# Patient Record
Sex: MALE | Race: WHITE | Employment: OTHER | URBAN - METROPOLITAN AREA
[De-identification: names, ages, dates, MRNs, and addresses within clinical notes are randomized per-mention and may not be internally consistent; named-entity substitution may affect disease eponyms.]

---

## 2020-02-02 ENCOUNTER — APPOINTMENT (OUTPATIENT)
Dept: GENERAL RADIOLOGY | Age: 80
DRG: 335 | End: 2020-02-02
Payer: MEDICARE

## 2020-02-02 ENCOUNTER — HOSPITAL ENCOUNTER (INPATIENT)
Age: 80
LOS: 12 days | Discharge: NURSING, REHAB OR LONG-TERM CARE | DRG: 335 | End: 2020-02-14
Attending: EMERGENCY MEDICINE | Admitting: INTERNAL MEDICINE
Payer: MEDICARE

## 2020-02-02 ENCOUNTER — APPOINTMENT (OUTPATIENT)
Dept: CT IMAGING | Age: 80
DRG: 335 | End: 2020-02-02
Payer: MEDICARE

## 2020-02-02 PROBLEM — N17.9 AKI (ACUTE KIDNEY INJURY) (HCC): Status: ACTIVE | Noted: 2020-02-02

## 2020-02-02 PROBLEM — K56.609 SBO (SMALL BOWEL OBSTRUCTION) (HCC): Status: ACTIVE | Noted: 2020-02-02

## 2020-02-02 PROBLEM — K56.609 SMALL BOWEL OBSTRUCTION (HCC): Status: ACTIVE | Noted: 2020-02-02

## 2020-02-02 PROBLEM — E78.5 HYPERLIPIDEMIA: Status: ACTIVE | Noted: 2020-02-02

## 2020-02-02 PROBLEM — I10 HYPERTENSION: Status: ACTIVE | Noted: 2020-02-02

## 2020-02-02 LAB
A/G RATIO: 1.6 (ref 1.1–2.2)
ALBUMIN SERPL-MCNC: 4.7 G/DL (ref 3.4–5)
ALP BLD-CCNC: 60 U/L (ref 40–129)
ALT SERPL-CCNC: 16 U/L (ref 10–40)
ANION GAP SERPL CALCULATED.3IONS-SCNC: 21 MMOL/L (ref 3–16)
AST SERPL-CCNC: 31 U/L (ref 15–37)
BASOPHILS ABSOLUTE: 0 K/UL (ref 0–0.2)
BASOPHILS RELATIVE PERCENT: 0.1 %
BILIRUB SERPL-MCNC: 1 MG/DL (ref 0–1)
BILIRUBIN URINE: NEGATIVE
BLOOD, URINE: NEGATIVE
BUN BLDV-MCNC: 90 MG/DL (ref 7–20)
CALCIUM SERPL-MCNC: 10 MG/DL (ref 8.3–10.6)
CHLORIDE BLD-SCNC: 88 MMOL/L (ref 99–110)
CLARITY: CLEAR
CO2: 26 MMOL/L (ref 21–32)
COLOR: YELLOW
CREAT SERPL-MCNC: 3.5 MG/DL (ref 0.8–1.3)
CREATININE URINE: 245.6 MG/DL (ref 39–259)
EOSINOPHILS ABSOLUTE: 0 K/UL (ref 0–0.6)
EOSINOPHILS RELATIVE PERCENT: 0 %
GFR AFRICAN AMERICAN: 21
GFR NON-AFRICAN AMERICAN: 17
GLOBULIN: 2.9 G/DL
GLUCOSE BLD-MCNC: 140 MG/DL (ref 70–99)
GLUCOSE URINE: NEGATIVE MG/DL
HCT VFR BLD CALC: 38.1 % (ref 40.5–52.5)
HEMOGLOBIN: 12.8 G/DL (ref 13.5–17.5)
KETONES, URINE: 15 MG/DL
LEUKOCYTE ESTERASE, URINE: NEGATIVE
LIPASE: 233 U/L (ref 13–60)
LYMPHOCYTES ABSOLUTE: 1.4 K/UL (ref 1–5.1)
LYMPHOCYTES RELATIVE PERCENT: 10.7 %
MCH RBC QN AUTO: 32.2 PG (ref 26–34)
MCHC RBC AUTO-ENTMCNC: 33.7 G/DL (ref 31–36)
MCV RBC AUTO: 95.6 FL (ref 80–100)
MICROSCOPIC EXAMINATION: ABNORMAL
MONOCYTES ABSOLUTE: 1.4 K/UL (ref 0–1.3)
MONOCYTES RELATIVE PERCENT: 10.1 %
NEUTROPHILS ABSOLUTE: 10.6 K/UL (ref 1.7–7.7)
NEUTROPHILS RELATIVE PERCENT: 79.1 %
NITRITE, URINE: NEGATIVE
PDW BLD-RTO: 12.6 % (ref 12.4–15.4)
PH UA: 5 (ref 5–8)
PLATELET # BLD: 241 K/UL (ref 135–450)
PMV BLD AUTO: 8.1 FL (ref 5–10.5)
POTASSIUM REFLEX MAGNESIUM: 4.9 MMOL/L (ref 3.5–5.1)
PROTEIN UA: NEGATIVE MG/DL
RBC # BLD: 3.98 M/UL (ref 4.2–5.9)
SODIUM BLD-SCNC: 135 MMOL/L (ref 136–145)
SODIUM URINE: <20 MMOL/L
SPECIFIC GRAVITY UA: >=1.03 (ref 1–1.03)
TOTAL PROTEIN: 7.6 G/DL (ref 6.4–8.2)
URINE TYPE: ABNORMAL
UROBILINOGEN, URINE: 0.2 E.U./DL
WBC # BLD: 13.4 K/UL (ref 4–11)

## 2020-02-02 PROCEDURE — 74176 CT ABD & PELVIS W/O CONTRAST: CPT

## 2020-02-02 PROCEDURE — 71045 X-RAY EXAM CHEST 1 VIEW: CPT

## 2020-02-02 PROCEDURE — 1200000000 HC SEMI PRIVATE

## 2020-02-02 PROCEDURE — 80053 COMPREHEN METABOLIC PANEL: CPT

## 2020-02-02 PROCEDURE — 99285 EMERGENCY DEPT VISIT HI MDM: CPT

## 2020-02-02 PROCEDURE — 85025 COMPLETE CBC W/AUTO DIFF WBC: CPT

## 2020-02-02 PROCEDURE — 96375 TX/PRO/DX INJ NEW DRUG ADDON: CPT

## 2020-02-02 PROCEDURE — 2580000003 HC RX 258: Performed by: NURSE PRACTITIONER

## 2020-02-02 PROCEDURE — 81003 URINALYSIS AUTO W/O SCOPE: CPT

## 2020-02-02 PROCEDURE — 6360000002 HC RX W HCPCS: Performed by: NURSE PRACTITIONER

## 2020-02-02 PROCEDURE — 74018 RADEX ABDOMEN 1 VIEW: CPT

## 2020-02-02 PROCEDURE — 96374 THER/PROPH/DIAG INJ IV PUSH: CPT

## 2020-02-02 PROCEDURE — 82570 ASSAY OF URINE CREATININE: CPT

## 2020-02-02 PROCEDURE — 83690 ASSAY OF LIPASE: CPT

## 2020-02-02 PROCEDURE — 51702 INSERT TEMP BLADDER CATH: CPT

## 2020-02-02 PROCEDURE — 84300 ASSAY OF URINE SODIUM: CPT

## 2020-02-02 RX ORDER — MORPHINE SULFATE 4 MG/ML
4 INJECTION, SOLUTION INTRAMUSCULAR; INTRAVENOUS EVERY 30 MIN PRN
Status: COMPLETED | OUTPATIENT
Start: 2020-02-02 | End: 2020-02-03

## 2020-02-02 RX ORDER — HYDROCODONE BITARTRATE AND ACETAMINOPHEN 10; 325 MG/15ML; MG/15ML
5 SOLUTION ORAL EVERY 4 HOURS PRN
COMMUNITY

## 2020-02-02 RX ORDER — MORPHINE SULFATE 2 MG/ML
2 INJECTION, SOLUTION INTRAMUSCULAR; INTRAVENOUS
Status: DISCONTINUED | OUTPATIENT
Start: 2020-02-02 | End: 2020-02-02 | Stop reason: HOSPADM

## 2020-02-02 RX ORDER — SODIUM CHLORIDE 0.9 % (FLUSH) 0.9 %
10 SYRINGE (ML) INJECTION EVERY 12 HOURS SCHEDULED
Status: DISCONTINUED | OUTPATIENT
Start: 2020-02-02 | End: 2020-02-14 | Stop reason: HOSPADM

## 2020-02-02 RX ORDER — LANOLIN ALCOHOL/MO/W.PET/CERES
1000 CREAM (GRAM) TOPICAL DAILY
COMMUNITY

## 2020-02-02 RX ORDER — SODIUM CHLORIDE 0.9 % (FLUSH) 0.9 %
10 SYRINGE (ML) INJECTION PRN
Status: DISCONTINUED | OUTPATIENT
Start: 2020-02-02 | End: 2020-02-14 | Stop reason: HOSPADM

## 2020-02-02 RX ORDER — HEPARIN SODIUM 5000 [USP'U]/ML
5000 INJECTION, SOLUTION INTRAVENOUS; SUBCUTANEOUS EVERY 8 HOURS SCHEDULED
Status: DISPENSED | OUTPATIENT
Start: 2020-02-03 | End: 2020-02-05

## 2020-02-02 RX ORDER — ONDANSETRON 2 MG/ML
4 INJECTION INTRAMUSCULAR; INTRAVENOUS EVERY 30 MIN PRN
Status: COMPLETED | OUTPATIENT
Start: 2020-02-02 | End: 2020-02-03

## 2020-02-02 RX ORDER — SODIUM CHLORIDE 9 MG/ML
INJECTION, SOLUTION INTRAVENOUS CONTINUOUS
Status: DISCONTINUED | OUTPATIENT
Start: 2020-02-02 | End: 2020-02-04

## 2020-02-02 RX ORDER — 0.9 % SODIUM CHLORIDE 0.9 %
1000 INTRAVENOUS SOLUTION INTRAVENOUS ONCE
Status: COMPLETED | OUTPATIENT
Start: 2020-02-02 | End: 2020-02-02

## 2020-02-02 RX ORDER — ONDANSETRON 2 MG/ML
4 INJECTION INTRAMUSCULAR; INTRAVENOUS EVERY 6 HOURS PRN
Status: DISCONTINUED | OUTPATIENT
Start: 2020-02-02 | End: 2020-02-02 | Stop reason: HOSPADM

## 2020-02-02 RX ORDER — SODIUM CHLORIDE 9 MG/ML
INJECTION, SOLUTION INTRAVENOUS CONTINUOUS
Status: DISCONTINUED | OUTPATIENT
Start: 2020-02-02 | End: 2020-02-02 | Stop reason: HOSPADM

## 2020-02-02 RX ADMIN — ONDANSETRON 4 MG: 2 INJECTION INTRAMUSCULAR; INTRAVENOUS at 17:51

## 2020-02-02 RX ADMIN — SODIUM CHLORIDE: 9 INJECTION, SOLUTION INTRAVENOUS at 19:22

## 2020-02-02 RX ADMIN — SODIUM CHLORIDE 1000 ML: 9 INJECTION, SOLUTION INTRAVENOUS at 17:51

## 2020-02-02 RX ADMIN — MORPHINE SULFATE 4 MG: 4 INJECTION, SOLUTION INTRAMUSCULAR; INTRAVENOUS at 19:41

## 2020-02-02 RX ADMIN — SODIUM CHLORIDE: 9 INJECTION, SOLUTION INTRAVENOUS at 21:26

## 2020-02-02 RX ADMIN — MORPHINE SULFATE 4 MG: 4 INJECTION, SOLUTION INTRAMUSCULAR; INTRAVENOUS at 20:23

## 2020-02-02 ASSESSMENT — PAIN SCALES - GENERAL
PAINLEVEL_OUTOF10: 6
PAINLEVEL_OUTOF10: 6
PAINLEVEL_OUTOF10: 7
PAINLEVEL_OUTOF10: 7

## 2020-02-02 ASSESSMENT — PAIN DESCRIPTION - LOCATION: LOCATION: ABDOMEN

## 2020-02-02 ASSESSMENT — PAIN DESCRIPTION - PAIN TYPE: TYPE: ACUTE PAIN

## 2020-02-02 ASSESSMENT — PAIN DESCRIPTION - ORIENTATION: ORIENTATION: MID

## 2020-02-02 NOTE — ED PROVIDER NOTES
Pulse: 85   Resp: 14   Temp:    SpO2: 98%     GENERAL: Patient is well-developed, elderly, frail, chronically ill in appearance. Awake andalert. Cooperative. Resting in bed. Appears uncomfortable but not toxic. HEENT:  Normocephalic, atraumatic. Conjunctivaappear normal. Sclera is non-icteric. External ears are normal.    NECK: Supple with normal ROM. Tracheamidline  LUNGS: Equal and symmetric chest rise. Breathing is unlabored. Speaking comfortably in fullsentences. Lungs are clear bilaterally to auscultation. Without wheezing, rales, or rhonchi. CADIOVASCULAR:  Regular rate and rhythm. Normal S1-S2 sounds. No rubs, or gallops. + murmur. GI: Firm to palpation, no bowel sounds auscultated, there is distention. No rebound tenderness, guarding or rigidity. No CVAT to palpation. No masses or hepatosplenomegaly to palpation. MUSCULOSKELETAL:  No gross deformities or trauma noted. Moving all extremities equally and appropriately. Normal ROM. SKIN: Warm/dry. Skin is intact. No rashes or lesions noted. PSYCHIATRIC: Mood and affect appropriate. Speech is clear and articulate. NEUROLOGIC: Alert and oriented. No focal motor or sensory deficits.      LABS   Results for orders placed or performed during the hospital encounter of 02/02/20   CBC Auto Differential   Result Value Ref Range    WBC 13.4 (H) 4.0 - 11.0 K/uL    RBC 3.98 (L) 4.20 - 5.90 M/uL    Hemoglobin 12.8 (L) 13.5 - 17.5 g/dL    Hematocrit 38.1 (L) 40.5 - 52.5 %    MCV 95.6 80.0 - 100.0 fL    MCH 32.2 26.0 - 34.0 pg    MCHC 33.7 31.0 - 36.0 g/dL    RDW 12.6 12.4 - 15.4 %    Platelets 759 875 - 517 K/uL    MPV 8.1 5.0 - 10.5 fL    Neutrophils % 79.1 %    Lymphocytes % 10.7 %    Monocytes % 10.1 %    Eosinophils % 0.0 %    Basophils % 0.1 %    Neutrophils Absolute 10.6 (H) 1.7 - 7.7 K/uL    Lymphocytes Absolute 1.4 1.0 - 5.1 K/uL    Monocytes Absolute 1.4 (H) 0.0 - 1.3 K/uL    Eosinophils Absolute 0.0 0.0 - 0.6 K/uL    Basophils Absolute 0.0 0.0 - TECHNOLOGIST PROVIDED HISTORY: Reason for exam:->abdominal pain, distention Additional Contrast?->None Reason for Exam: vomiting,constipation,abd distention x 3 days Type of Exam: Initial Additional signs and symptoms: unable to urinate,no BM x 1 week FINDINGS: Lower Chest: The esophagus appears patulous and fluid filled. Small left effusion and lower lobe atelectatic changes. Organs: The liver appears unremarkable. Status post cholecystectomy. Spleen and pancreas appear stable. GI/Bowel: Severe dilation of stomach, duodenum and the rest of the small bowel. A transition point is likely in the pelvis. Findings compatible with small bowel obstruction. Large bowel does not appear distended. Pelvis: Urinary bladder contains Stout catheter. Prostate and seminal vesicles appear stable. Peritoneum/Retroperitoneum: No evidence of free air. No evidence of lymphadenopathy. Bones/Soft Tissues: Multiple compression injuries involving L1, L2 and L3 of unknown acuity. Metallic fragments seen in the retroperitoneum. 1. Small-bowel obstruction with a transition point likely in the pelvis. No evidence of free air to suggest perforation. 2. Distended esophagus which is fluid filled likely related to reflux. 3. Stable position of Stout catheter in the urinary bladder. Xr Abdomen (kub) (single Ap View)    Result Date: 2/2/2020  EXAMINATION: ONE SUPINE XRAY VIEW(S) OF THE ABDOMEN 2/2/2020 5:21 pm COMPARISON: None. HISTORY: ORDERING SYSTEM PROVIDED HISTORY: unable to pee, constipated TECHNOLOGIST PROVIDED HISTORY: Reason for exam:->unable to pee, constipated Reason for Exam: pt has not been able to urinate x 3 days; constipation; mid to lower abdomen pressure/pain; pt states he had a tumor removed and cholecystectomy Acuity: Acute Type of Exam: Initial Relevant Medical/Surgical History: see epic FINDINGS: Bowel gas pattern is nonspecific.   Large amount of formed stool is noted throughout the colon corresponding to given bowel obstruction) ([facility] [address]    3. Urinary retention    4. Constipation, unspecified constipation type    5. History of compression fracture of spine       DISPOSITION  Admitted. Comment: Pleasenote this report has been produced using speech recognition software and may contain errors related to that system including errors in grammar, punctuation, and spelling, as well as words and phrases that may beinappropriate. If there are any questions or concerns please feel free to contact the dictating provider for clarification.         JAMEY Byrd - ANGELI  02/02/20 1954

## 2020-02-02 NOTE — ED PROVIDER NOTES
inappropriate. If there are any questions or concerns please feel free to contact the dictating provider for clarification.            Khang Workman MD  02/02/20 3846

## 2020-02-03 ENCOUNTER — APPOINTMENT (OUTPATIENT)
Dept: ULTRASOUND IMAGING | Age: 80
DRG: 335 | End: 2020-02-03
Payer: MEDICARE

## 2020-02-03 PROBLEM — K56.609 SMALL BOWEL OBSTRUCTION (HCC): Status: RESOLVED | Noted: 2020-02-02 | Resolved: 2020-02-03

## 2020-02-03 LAB
A/G RATIO: 1.4 (ref 1.1–2.2)
ALBUMIN SERPL-MCNC: 3.6 G/DL (ref 3.4–5)
ALP BLD-CCNC: 52 U/L (ref 40–129)
ALT SERPL-CCNC: 22 U/L (ref 10–40)
ANION GAP SERPL CALCULATED.3IONS-SCNC: 14 MMOL/L (ref 3–16)
AST SERPL-CCNC: 41 U/L (ref 15–37)
BASOPHILS ABSOLUTE: 0 K/UL (ref 0–0.2)
BASOPHILS RELATIVE PERCENT: 0.1 %
BILIRUB SERPL-MCNC: 1 MG/DL (ref 0–1)
BUN BLDV-MCNC: 92 MG/DL (ref 7–20)
CALCIUM SERPL-MCNC: 8.9 MG/DL (ref 8.3–10.6)
CHLORIDE BLD-SCNC: 95 MMOL/L (ref 99–110)
CO2: 31 MMOL/L (ref 21–32)
CREAT SERPL-MCNC: 2.7 MG/DL (ref 0.8–1.3)
EOSINOPHILS ABSOLUTE: 0 K/UL (ref 0–0.6)
EOSINOPHILS RELATIVE PERCENT: 0 %
GFR AFRICAN AMERICAN: 28
GFR NON-AFRICAN AMERICAN: 23
GLOBULIN: 2.5 G/DL
GLUCOSE BLD-MCNC: 117 MG/DL (ref 70–99)
HCT VFR BLD CALC: 33.9 % (ref 40.5–52.5)
HEMOGLOBIN: 11.7 G/DL (ref 13.5–17.5)
LYMPHOCYTES ABSOLUTE: 0.8 K/UL (ref 1–5.1)
LYMPHOCYTES RELATIVE PERCENT: 8.5 %
MCH RBC QN AUTO: 32.8 PG (ref 26–34)
MCHC RBC AUTO-ENTMCNC: 34.6 G/DL (ref 31–36)
MCV RBC AUTO: 94.7 FL (ref 80–100)
MONOCYTES ABSOLUTE: 1.1 K/UL (ref 0–1.3)
MONOCYTES RELATIVE PERCENT: 12 %
NEUTROPHILS ABSOLUTE: 7.3 K/UL (ref 1.7–7.7)
NEUTROPHILS RELATIVE PERCENT: 79.4 %
PDW BLD-RTO: 12.5 % (ref 12.4–15.4)
PLATELET # BLD: 198 K/UL (ref 135–450)
PMV BLD AUTO: 8.1 FL (ref 5–10.5)
POTASSIUM REFLEX MAGNESIUM: 4.7 MMOL/L (ref 3.5–5.1)
RBC # BLD: 3.58 M/UL (ref 4.2–5.9)
SODIUM BLD-SCNC: 140 MMOL/L (ref 136–145)
TOTAL PROTEIN: 6.1 G/DL (ref 6.4–8.2)
WBC # BLD: 9.2 K/UL (ref 4–11)

## 2020-02-03 PROCEDURE — 80053 COMPREHEN METABOLIC PANEL: CPT

## 2020-02-03 PROCEDURE — 99222 1ST HOSP IP/OBS MODERATE 55: CPT | Performed by: SURGERY

## 2020-02-03 PROCEDURE — 36415 COLL VENOUS BLD VENIPUNCTURE: CPT

## 2020-02-03 PROCEDURE — 76770 US EXAM ABDO BACK WALL COMP: CPT

## 2020-02-03 PROCEDURE — 85025 COMPLETE CBC W/AUTO DIFF WBC: CPT

## 2020-02-03 PROCEDURE — 6370000000 HC RX 637 (ALT 250 FOR IP): Performed by: SURGERY

## 2020-02-03 PROCEDURE — 6360000002 HC RX W HCPCS: Performed by: NURSE PRACTITIONER

## 2020-02-03 PROCEDURE — 1200000000 HC SEMI PRIVATE

## 2020-02-03 PROCEDURE — 2580000003 HC RX 258: Performed by: NURSE PRACTITIONER

## 2020-02-03 PROCEDURE — 6360000002 HC RX W HCPCS: Performed by: INTERNAL MEDICINE

## 2020-02-03 RX ORDER — ONDANSETRON 2 MG/ML
4 INJECTION INTRAMUSCULAR; INTRAVENOUS EVERY 6 HOURS PRN
Status: DISCONTINUED | OUTPATIENT
Start: 2020-02-03 | End: 2020-02-14 | Stop reason: HOSPADM

## 2020-02-03 RX ORDER — BISACODYL 10 MG
10 SUPPOSITORY, RECTAL RECTAL ONCE
Status: COMPLETED | OUTPATIENT
Start: 2020-02-03 | End: 2020-02-03

## 2020-02-03 RX ORDER — MORPHINE SULFATE 2 MG/ML
2 INJECTION, SOLUTION INTRAMUSCULAR; INTRAVENOUS EVERY 4 HOURS PRN
Status: DISCONTINUED | OUTPATIENT
Start: 2020-02-03 | End: 2020-02-06

## 2020-02-03 RX ORDER — LORAZEPAM 2 MG/ML
0.5 INJECTION INTRAMUSCULAR ONCE
Status: COMPLETED | OUTPATIENT
Start: 2020-02-03 | End: 2020-02-03

## 2020-02-03 RX ADMIN — HEPARIN SODIUM 5000 UNITS: 5000 INJECTION INTRAVENOUS; SUBCUTANEOUS at 16:26

## 2020-02-03 RX ADMIN — HEPARIN SODIUM 5000 UNITS: 5000 INJECTION INTRAVENOUS; SUBCUTANEOUS at 21:43

## 2020-02-03 RX ADMIN — MORPHINE SULFATE 4 MG: 4 INJECTION, SOLUTION INTRAMUSCULAR; INTRAVENOUS at 04:08

## 2020-02-03 RX ADMIN — SODIUM CHLORIDE: 9 INJECTION, SOLUTION INTRAVENOUS at 13:33

## 2020-02-03 RX ADMIN — BISACODYL 10 MG: 10 SUPPOSITORY RECTAL at 13:28

## 2020-02-03 RX ADMIN — BISACODYL 10 MG: 10 SUPPOSITORY RECTAL at 16:52

## 2020-02-03 RX ADMIN — MORPHINE SULFATE 2 MG: 2 INJECTION, SOLUTION INTRAMUSCULAR; INTRAVENOUS at 21:39

## 2020-02-03 RX ADMIN — ONDANSETRON 4 MG: 2 INJECTION INTRAMUSCULAR; INTRAVENOUS at 17:36

## 2020-02-03 RX ADMIN — SODIUM CHLORIDE: 9 INJECTION, SOLUTION INTRAVENOUS at 04:08

## 2020-02-03 RX ADMIN — LORAZEPAM 0.5 MG: 2 INJECTION, SOLUTION INTRAMUSCULAR; INTRAVENOUS at 20:01

## 2020-02-03 RX ADMIN — HEPARIN SODIUM 5000 UNITS: 5000 INJECTION INTRAVENOUS; SUBCUTANEOUS at 05:42

## 2020-02-03 RX ADMIN — MORPHINE SULFATE 2 MG: 2 INJECTION, SOLUTION INTRAMUSCULAR; INTRAVENOUS at 17:31

## 2020-02-03 RX ADMIN — ONDANSETRON 4 MG: 2 INJECTION INTRAMUSCULAR; INTRAVENOUS at 21:45

## 2020-02-03 ASSESSMENT — PAIN SCALES - GENERAL
PAINLEVEL_OUTOF10: 7
PAINLEVEL_OUTOF10: 0
PAINLEVEL_OUTOF10: 7
PAINLEVEL_OUTOF10: 5
PAINLEVEL_OUTOF10: 7
PAINLEVEL_OUTOF10: 5

## 2020-02-03 NOTE — PROGRESS NOTES
4 Eyes Skin Assessment     The patient is being assess for  Admission    I agree that 2 RN's have performed a thorough Head to Toe Skin Assessment on the patient. ALL assessment sites listed below have been assessed. Areas assessed by both nurses: Zaida Borrero RN  [x]   Head, Face, and Ears   [x]   Shoulders, Back, and Chest  [x]   Arms, Elbows, and Hands -Blanchable redness on both elbows. Left elbow abrasion  [x]   Coccyx, Sacrum, and Ischum- Blanchable redness on coccyx  [x]   Legs, Feet, and Heels        Does the Patient have Skin Breakdown?   No         Mohsen Prevention initiated:  Yes   Wound Care Orders initiated:  NA      [facility] nurse consulted for Pressure Injury (Stage 3,4, Unstageable, DTI, NWPT, and Complex wounds):  NA      Nurse 1 eSignature: Electronically signed by Maranda Rashid RN on 2/3/20 at 12:48 AM    **SHARE this note so that the co-signing nurse is able to place an eSignature**    Nurse 2 eSignature: Electronically signed by Helga Anderson RN on 2/3/20 at 1:11 AM

## 2020-02-03 NOTE — ED NOTES
16 fr salem sump placed per nurse to right nares, patient tolerated well. Immediate return noted of stomach contents, CXR ordered for confirmation.      Ricky Hewitt RN  02/02/20 2015

## 2020-02-03 NOTE — H&P
HYDROcodone-acetaminophen (ZAMICET)  MG per 15ML SOLN solution Take 5 mLs by mouth every 4 hours as needed for Pain. Yes Historical Provider, MD   vitamin B-12 (CYANOCOBALAMIN) 1000 MCG tablet Take 1,000 mcg by mouth daily   Yes Historical Provider, MD   clonazePAM (KLONOPIN) 0.5 MG tablet TAKE ONE TABLET BY MOUTH AT BEDTIME AS NEEDED FOR ANXIETY 2/5/18 4/6/18  Historical Provider, MD   simvastatin (ZOCOR) 20 MG tablet Take 20 mg by mouth 2/2/18   Historical Provider, MD   lisinopril (PRINIVIL;ZESTRIL) 2.5 MG tablet Take 2.5 mg by mouth    Historical Provider, MD   ibuprofen (ADVIL;MOTRIN) 200 MG tablet Take by mouth    Historical Provider, MD   folic acid (FOLVITE) 1 MG tablet TAKE ONE TABLET BY MOUTH DAILY 7/11/17   Historical Provider, MD   triamcinolone (KENALOG) 0.1 % cream Apply twice a day to affected areas on legs, feet, and arms for no longer than two weeks at a time 1/16/18   Historical Provider, MD     Allergies:  Patient has no known allergies. Social History:      The patient currently lives at home    TOBACCO:   reports that he has been smoking. He has never used smokeless tobacco.  ETOH:   reports current alcohol use. [employer] E-Cigarettes Vaping or Juuling     Questions Responses    E-Cigarette Use     Start Date     Does device contain nicotine? Quit Date     E-Cigarette Type         Family History:      History reviewed. No pertinent family history. REVIEW OF SYSTEMS:   Pertinent positives as noted in the HPI. All other systems reviewed and negative. PHYSICAL EXAM PERFORMED:    /65   Pulse 85   Temp 98.7 °F (37.1 °C)   Resp 14   Wt 119 lb (54 kg)   SpO2 98%   BMI 15.70 kg/m²     General appearance: Pleasant male in no apparent distress, appears stated age and cooperative. HEENT: Pupils equal, round, and reactive to light. Extra ocular muscles intact. Conjunctivae/corneas clear. Right nare NGT  Neck: Supple, with full range of motion. No jugular venous distention. Trachea midline. Respiratory:  Normal respiratory effort. Clear to auscultation, bilaterally without Rales/Wheezes/Rhonchi. Cardiovascular:  Regular rate and rhythm with normal S1/S2 without murmurs, rubs or gallops. Abdomen: Soft, tender, distended with normal bowel sounds. Musculoskeletal:  No clubbing, cyanosis or edema bilaterally. Full range of motion without deformity. Skin: Skin color, texture, turgor normal.  No significant rashes or lesions. Neurologic:  Neurovascularly intact. Cranial nerves: II-XII intact, grossly non-focal.  Psychiatric:  Alert and oriented, thought content appropriate, normal insight  Capillary Refill: Brisk,< 3 seconds   Peripheral Pulses: +2 palpable, equal bilaterally     Labs:     Recent Labs     02/02/20  1747   WBC 13.4*   HGB 12.8*   HCT 38.1*        Recent Labs     02/02/20  1747   *   K 4.9   CL 88*   CO2 26   BUN 90*   CREATININE 3.5*   CALCIUM 10.0     Recent Labs     02/02/20  1747   AST 31   ALT 16   BILITOT 1.0   ALKPHOS 60     Urinalysis:      Lab Results   Component Value Date    NITRU Negative 02/02/2020    BLOODU Negative 02/02/2020    SPECGRAV >=1.030 02/02/2020    GLUCOSEU Negative 02/02/2020     Radiology:     CXR: I have reviewed the CXR with the following interpretation: N/A    EKG:  I have reviewed the EKG with the following interpretation: N/A    CT ABDOMEN PELVIS WO CONTRAST Additional Contrast? None   Final Result   1. Small-bowel obstruction with a transition point likely in the pelvis. No   evidence of free air to suggest perforation. 2. Distended esophagus which is fluid filled likely related to reflux. 3. Stable position of Stout catheter in the urinary bladder. XR ABDOMEN (KUB) (SINGLE AP VIEW)   Final Result   1. Large amount of formed stool throughout the colon corresponding to given   history of constipation. 2. No bowel obstruction identified.            ASSESSMENT:    Active Hospital Problems    Diagnosis Date Noted

## 2020-02-03 NOTE — CONSULTS
Department of General Surgery Consult    PATIENT NAME: Ashwin Morrison   YOB: 1940    ADMISSION DATE: 2/2/2020  5:02 PM      TODAY'S DATE: 2/3/2020    Reason for Consult: Small bowel obstruction    Chief Complaint: Abdominal pain with nausea vomiting    Requesting Physician: Preeti Nieves    HISTORY OF PRESENT ILLNESS:              The patient is a 79 y.o. male who presents with abdominal pain with nausea and vomiting. He is also been having some urinary retention. He states he has been feeling constipated, and his last bowel movement was almost a week ago. Over the course of the past few days, he has had increasing abdominal distention with nausea and vomiting. He had some mild discomfort. He states he actually feels much better now. He states his belly feels soft and does not hurt. Past Medical History:        Diagnosis Date    Hyperlipidemia     Hypertension        Past Surgical History:    History reviewed. No pertinent surgical history. Current Medications:   Current Facility-Administered Medications: bisacodyl (DULCOLAX) suppository 10 mg, 10 mg, Rectal, Once  phenol 1.4 % mouth spray 1 spray, 1 spray, Mouth/Throat, Q2H PRN  ondansetron (ZOFRAN) injection 4 mg, 4 mg, Intravenous, Q30 Min PRN  sodium chloride flush 0.9 % injection 10 mL, 10 mL, Intravenous, 2 times per day  sodium chloride flush 0.9 % injection 10 mL, 10 mL, Intravenous, PRN  heparin (porcine) injection 5,000 Units, 5,000 Units, Subcutaneous, 3 times per day  0.9 % sodium chloride infusion, , Intravenous, Continuous  Prior to Admission medications    Medication Sig Start Date End Date Taking? Authorizing Provider   HYDROcodone-acetaminophen (ZAMICET)  MG per 15ML SOLN solution Take 5 mLs by mouth every 4 hours as needed for Pain.    Yes Historical Provider, MD   vitamin B-12 (CYANOCOBALAMIN) 1000 MCG tablet Take 1,000 mcg by mouth daily   Yes Historical Provider, MD   clonazePAM (KLONOPIN) 0.5 MG tablet TAKE ONE [address] Surgery  [address]

## 2020-02-03 NOTE — PROGRESS NOTES
WBC 13.4* 9.2   HGB 12.8* 11.7*   HCT 38.1* 33.9*    198     Recent Labs     02/02/20  1747 02/03/20  0558   * 140   K 4.9 4.7   CL 88* 95*   CO2 26 31   BUN 90* 92*   CREATININE 3.5* 2.7*   CALCIUM 10.0 8.9     Recent Labs     02/02/20  1747 02/03/20  0558   AST 31 41*   ALT 16 22   BILITOT 1.0 1.0   ALKPHOS 60 52     No results for input(s): INR in the last 72 hours. No results for input(s): José Miguel Prayer in the last 72 hours. Urinalysis:      Lab Results   Component Value Date    NITRU Negative 02/02/2020    BLOODU Negative 02/02/2020    SPECGRAV >=1.030 02/02/2020    GLUCOSEU Negative 02/02/2020       Radiology:  XR CHEST PORTABLE   Final Result   Enteric tube projects near the level of lower esophagus. Tube should be   advanced and reimaging should be considered to document appropriate   positioning. Left lower lung airspace disease suspicious of pneumonia versus atelectasis. CT ABDOMEN PELVIS WO CONTRAST Additional Contrast? None   Final Result   1. Small-bowel obstruction with a transition point likely in the pelvis. No   evidence of free air to suggest perforation. 2. Distended esophagus which is fluid filled likely related to reflux. 3. Stable position of Stout catheter in the urinary bladder. XR ABDOMEN (KUB) (SINGLE AP VIEW)   Final Result   1. Large amount of formed stool throughout the colon corresponding to given   history of constipation. 2. No bowel obstruction identified. XR ABDOMEN (2 VIEWS)    (Results Pending)           Assessment/Plan:    Active Hospital Problems    Diagnosis    SBO (small bowel obstruction) ([facility] [address] [K56.609]     Priority: High    GEOVANNA (acute kidney injury) ([facility] [address] [N17.9]     Priority: Medium    Hypertension [I10]    Hyperlipidemia [E78.5]     SBO : noted on CT. Gen Surg consulted-conservative management recommended for now. Continue NPO, IVF, pain control PRN.  AXR in am       GEOVANNA : likely due to vol depletion, compounded by use of lisinopril, NSAIDs. Hold lisinopril and avoid NSAIDs. Improving with IVF. Check renal ultrasound given initial urinary retention. Continue IV fluids and avoid nephrotoxins. Monitor      Acute urinary retention: bladder scan 288cc, likely related to SBO. Improved s/p benito placement. Hyperlipidemia: resume statin when no longer NPO. HTN: held lisinopril due to GEOVANNA. Elevated lipase: likely related to SBO and of unclear clinical significance at this time. Pancreas normal on CT.        DVT Prophylaxis: Heparin  Diet: Diet NPO Effective Now  Code Status: Full Code    PT/OT Eval Status: Not indicated at this time    Dispo -hard to say,  pending clinical course      Brigitte Conway MD

## 2020-02-03 NOTE — ED NOTES
Patient states after NGT placement pain is still there, patient medicated for pain per PRN medication. Awaiting patient to go to C3, explained room assignment to patient and family, verbalizes understanding.      Tamika Rosario RN  02/02/20 2029

## 2020-02-04 ENCOUNTER — APPOINTMENT (OUTPATIENT)
Dept: GENERAL RADIOLOGY | Age: 80
DRG: 335 | End: 2020-02-04
Payer: MEDICARE

## 2020-02-04 LAB
ANION GAP SERPL CALCULATED.3IONS-SCNC: 10 MMOL/L (ref 3–16)
ANION GAP SERPL CALCULATED.3IONS-SCNC: 11 MMOL/L (ref 3–16)
ANION GAP SERPL CALCULATED.3IONS-SCNC: 11 MMOL/L (ref 3–16)
BUN BLDV-MCNC: 39 MG/DL (ref 7–20)
BUN BLDV-MCNC: 43 MG/DL (ref 7–20)
BUN BLDV-MCNC: 45 MG/DL (ref 7–20)
CALCIUM SERPL-MCNC: 8.5 MG/DL (ref 8.3–10.6)
CALCIUM SERPL-MCNC: 8.6 MG/DL (ref 8.3–10.6)
CALCIUM SERPL-MCNC: 8.6 MG/DL (ref 8.3–10.6)
CHLORIDE BLD-SCNC: 102 MMOL/L (ref 99–110)
CHLORIDE BLD-SCNC: 103 MMOL/L (ref 99–110)
CHLORIDE BLD-SCNC: 104 MMOL/L (ref 99–110)
CO2: 36 MMOL/L (ref 21–32)
CO2: 37 MMOL/L (ref 21–32)
CO2: 38 MMOL/L (ref 21–32)
CREAT SERPL-MCNC: 0.8 MG/DL (ref 0.8–1.3)
CREAT SERPL-MCNC: 0.9 MG/DL (ref 0.8–1.3)
CREAT SERPL-MCNC: 0.9 MG/DL (ref 0.8–1.3)
GFR AFRICAN AMERICAN: >60
GFR NON-AFRICAN AMERICAN: >60
GLUCOSE BLD-MCNC: 102 MG/DL (ref 70–99)
GLUCOSE BLD-MCNC: 105 MG/DL (ref 70–99)
GLUCOSE BLD-MCNC: 109 MG/DL (ref 70–99)
MAGNESIUM: 2.8 MG/DL (ref 1.8–2.4)
MAGNESIUM: 2.8 MG/DL (ref 1.8–2.4)
POTASSIUM REFLEX MAGNESIUM: 3 MMOL/L (ref 3.5–5.1)
POTASSIUM REFLEX MAGNESIUM: 3.1 MMOL/L (ref 3.5–5.1)
POTASSIUM SERPL-SCNC: 3.2 MMOL/L (ref 3.5–5.1)
SODIUM BLD-SCNC: 150 MMOL/L (ref 136–145)
SODIUM BLD-SCNC: 151 MMOL/L (ref 136–145)
SODIUM BLD-SCNC: 151 MMOL/L (ref 136–145)

## 2020-02-04 PROCEDURE — 2580000003 HC RX 258: Performed by: NURSE PRACTITIONER

## 2020-02-04 PROCEDURE — 80048 BASIC METABOLIC PNL TOTAL CA: CPT

## 2020-02-04 PROCEDURE — APPSS45 APP SPLIT SHARED TIME 31-45 MINUTES: Performed by: CLINICAL NURSE SPECIALIST

## 2020-02-04 PROCEDURE — 83735 ASSAY OF MAGNESIUM: CPT

## 2020-02-04 PROCEDURE — 2580000003 HC RX 258: Performed by: INTERNAL MEDICINE

## 2020-02-04 PROCEDURE — 36415 COLL VENOUS BLD VENIPUNCTURE: CPT

## 2020-02-04 PROCEDURE — 6370000000 HC RX 637 (ALT 250 FOR IP): Performed by: SURGERY

## 2020-02-04 PROCEDURE — 1200000000 HC SEMI PRIVATE

## 2020-02-04 PROCEDURE — 6360000002 HC RX W HCPCS: Performed by: NURSE PRACTITIONER

## 2020-02-04 PROCEDURE — 99232 SBSQ HOSP IP/OBS MODERATE 35: CPT | Performed by: SURGERY

## 2020-02-04 PROCEDURE — 6360000002 HC RX W HCPCS: Performed by: INTERNAL MEDICINE

## 2020-02-04 PROCEDURE — 74019 RADEX ABDOMEN 2 VIEWS: CPT

## 2020-02-04 PROCEDURE — 6370000000 HC RX 637 (ALT 250 FOR IP)

## 2020-02-04 RX ORDER — POTASSIUM CHLORIDE 7.45 MG/ML
10 INJECTION INTRAVENOUS ONCE
Status: COMPLETED | OUTPATIENT
Start: 2020-02-05 | End: 2020-02-05

## 2020-02-04 RX ORDER — POTASSIUM CHLORIDE 7.45 MG/ML
40 INJECTION INTRAVENOUS
Status: COMPLETED | OUTPATIENT
Start: 2020-02-04 | End: 2020-02-04

## 2020-02-04 RX ORDER — LORAZEPAM 2 MG/ML
0.5 INJECTION INTRAMUSCULAR NIGHTLY PRN
Status: DISCONTINUED | OUTPATIENT
Start: 2020-02-04 | End: 2020-02-13

## 2020-02-04 RX ORDER — POTASSIUM CHLORIDE 7.45 MG/ML
10 INJECTION INTRAVENOUS ONCE
Status: COMPLETED | OUTPATIENT
Start: 2020-02-04 | End: 2020-02-04

## 2020-02-04 RX ORDER — POTASSIUM CHLORIDE 7.45 MG/ML
10 INJECTION INTRAVENOUS
Status: COMPLETED | OUTPATIENT
Start: 2020-02-04 | End: 2020-02-04

## 2020-02-04 RX ORDER — DEXTROSE MONOHYDRATE 50 MG/ML
INJECTION, SOLUTION INTRAVENOUS CONTINUOUS
Status: DISCONTINUED | OUTPATIENT
Start: 2020-02-04 | End: 2020-02-05

## 2020-02-04 RX ORDER — POTASSIUM CHLORIDE 7.45 MG/ML
40 INJECTION INTRAVENOUS ONCE
Status: DISCONTINUED | OUTPATIENT
Start: 2020-02-04 | End: 2020-02-04 | Stop reason: SDUPTHER

## 2020-02-04 RX ADMIN — POTASSIUM CHLORIDE 10 MEQ: 7.46 INJECTION, SOLUTION INTRAVENOUS at 21:18

## 2020-02-04 RX ADMIN — POTASSIUM CHLORIDE 10 MEQ: 7.46 INJECTION, SOLUTION INTRAVENOUS at 14:32

## 2020-02-04 RX ADMIN — DEXTROSE MONOHYDRATE: 50 INJECTION, SOLUTION INTRAVENOUS at 14:29

## 2020-02-04 RX ADMIN — ONDANSETRON 4 MG: 2 INJECTION INTRAMUSCULAR; INTRAVENOUS at 10:39

## 2020-02-04 RX ADMIN — HEPARIN SODIUM 5000 UNITS: 5000 INJECTION INTRAVENOUS; SUBCUTANEOUS at 15:03

## 2020-02-04 RX ADMIN — MORPHINE SULFATE 2 MG: 2 INJECTION, SOLUTION INTRAMUSCULAR; INTRAVENOUS at 16:04

## 2020-02-04 RX ADMIN — POTASSIUM CHLORIDE 10 MEQ: 7.46 INJECTION, SOLUTION INTRAVENOUS at 15:30

## 2020-02-04 RX ADMIN — POTASSIUM CHLORIDE 10 MEQ: 7.46 INJECTION, SOLUTION INTRAVENOUS at 18:16

## 2020-02-04 RX ADMIN — ONDANSETRON 4 MG: 2 INJECTION INTRAMUSCULAR; INTRAVENOUS at 16:04

## 2020-02-04 RX ADMIN — SODIUM CHLORIDE: 9 INJECTION, SOLUTION INTRAVENOUS at 10:29

## 2020-02-04 RX ADMIN — POTASSIUM CHLORIDE 10 MEQ: 7.46 INJECTION, SOLUTION INTRAVENOUS at 19:53

## 2020-02-04 RX ADMIN — ONDANSETRON 4 MG: 2 INJECTION INTRAMUSCULAR; INTRAVENOUS at 05:43

## 2020-02-04 RX ADMIN — POTASSIUM CHLORIDE 10 MEQ: 7.46 INJECTION, SOLUTION INTRAVENOUS at 23:19

## 2020-02-04 RX ADMIN — Medication: at 16:42

## 2020-02-04 RX ADMIN — PHENOL 1 SPRAY: 1.5 LIQUID ORAL at 08:00

## 2020-02-04 RX ADMIN — MORPHINE SULFATE 2 MG: 2 INJECTION, SOLUTION INTRAMUSCULAR; INTRAVENOUS at 10:39

## 2020-02-04 RX ADMIN — POTASSIUM CHLORIDE 10 MEQ: 7.46 INJECTION, SOLUTION INTRAVENOUS at 16:42

## 2020-02-04 RX ADMIN — HEPARIN SODIUM 5000 UNITS: 5000 INJECTION INTRAVENOUS; SUBCUTANEOUS at 20:59

## 2020-02-04 RX ADMIN — MORPHINE SULFATE 2 MG: 2 INJECTION, SOLUTION INTRAMUSCULAR; INTRAVENOUS at 05:43

## 2020-02-04 ASSESSMENT — PAIN SCALES - GENERAL
PAINLEVEL_OUTOF10: 7
PAINLEVEL_OUTOF10: 7
PAINLEVEL_OUTOF10: 5

## 2020-02-04 NOTE — PROGRESS NOTES
Consult PerfectServed/called to [facility] on 02/04/20 at [address] [place] [address]

## 2020-02-04 NOTE — PROGRESS NOTES
Patient assessment complete and documented. VSS. A&O x4. Patient with NG in place to CLWS. Bowel sounds hypoactive. Patient with complaints of abd pain 5 out 10. Patient given PRN pain medication (see MAR). Patient denies any other needs at this time. Bed is in lowest locked position with call light within reach. Will continue to monitor situation.

## 2020-02-04 NOTE — PROGRESS NOTES
Pt resting in bed. Call light within reach and bed check on. NG tube to continuous suction. Pt is alert and orientated. Ama

## 2020-02-04 NOTE — PROGRESS NOTES
198     Recent Labs     02/02/20 1747 02/03/20  0558   * 140   K 4.9 4.7   CL 88* 95*   CO2 26 31   BUN 90* 92*   CREATININE 3.5* 2.7*   CALCIUM 10.0 8.9     Recent Labs     02/02/20  1747 02/03/20  0558   AST 31 41*   ALT 16 22   BILITOT 1.0 1.0   ALKPHOS 60 52     No results for input(s): INR in the last 72 hours. No results for input(s): Dionisio Ramming in the last 72 hours. Urinalysis:      Lab Results   Component Value Date    NITRU Negative 02/02/2020    BLOODU Negative 02/02/2020    SPECGRAV >=1.030 02/02/2020    GLUCOSEU Negative 02/02/2020       Radiology:  US RENAL COMPLETE   Final Result   1. No evidence of hydronephrosis. 2. Findings suggestive of presence of left-sided renal cysts, largest of   which measures up to approximately 4 cm in maximum transverse diameter as   described above. XR CHEST PORTABLE   Final Result   Enteric tube projects near the level of lower esophagus. Tube should be   advanced and reimaging should be considered to document appropriate   positioning. Left lower lung airspace disease suspicious of pneumonia versus atelectasis. CT ABDOMEN PELVIS WO CONTRAST Additional Contrast? None   Final Result   1. Small-bowel obstruction with a transition point likely in the pelvis. No   evidence of free air to suggest perforation. 2. Distended esophagus which is fluid filled likely related to reflux. 3. Stable position of Stout catheter in the urinary bladder. XR ABDOMEN (KUB) (SINGLE AP VIEW)   Final Result   1. Large amount of formed stool throughout the colon corresponding to given   history of constipation. 2. No bowel obstruction identified.          XR ABDOMEN (2 VIEWS)    (Results Pending)           Assessment/Plan:    Active Hospital Problems    Diagnosis    SBO (small bowel obstruction) ([facility] [address] [K56.609]     Priority: High    GEOVANNA (acute kidney injury) ([facility] [address] [N17.9]     Priority: Medium    Hypertension [I10]   

## 2020-02-04 NOTE — PROGRESS NOTES
Thanks for consulting [facility] for the care of Molly Jean. Agree with dextrose drip  And potassium  Repeat labs at 6 pm today    Full consult will follow  Please call with questions at-  24 Hrs Answering service [phone]  Perfect serve, or cell phone 7 am - 002 Evan Oliva MD   [facility]  [facility]. [facility]

## 2020-02-05 ENCOUNTER — APPOINTMENT (OUTPATIENT)
Dept: GENERAL RADIOLOGY | Age: 80
DRG: 335 | End: 2020-02-05
Payer: MEDICARE

## 2020-02-05 LAB
ANION GAP SERPL CALCULATED.3IONS-SCNC: 10 MMOL/L (ref 3–16)
ANION GAP SERPL CALCULATED.3IONS-SCNC: 10 MMOL/L (ref 3–16)
BASOPHILS ABSOLUTE: 0 K/UL (ref 0–0.2)
BASOPHILS RELATIVE PERCENT: 0.2 %
BUN BLDV-MCNC: 29 MG/DL (ref 7–20)
BUN BLDV-MCNC: 35 MG/DL (ref 7–20)
CALCIUM SERPL-MCNC: 8.5 MG/DL (ref 8.3–10.6)
CALCIUM SERPL-MCNC: 8.8 MG/DL (ref 8.3–10.6)
CHLORIDE BLD-SCNC: 101 MMOL/L (ref 99–110)
CHLORIDE BLD-SCNC: 97 MMOL/L (ref 99–110)
CO2: 38 MMOL/L (ref 21–32)
CO2: 38 MMOL/L (ref 21–32)
CREAT SERPL-MCNC: 0.9 MG/DL (ref 0.8–1.3)
CREAT SERPL-MCNC: 0.9 MG/DL (ref 0.8–1.3)
EOSINOPHILS ABSOLUTE: 0 K/UL (ref 0–0.6)
EOSINOPHILS RELATIVE PERCENT: 0.4 %
GFR AFRICAN AMERICAN: >60
GFR AFRICAN AMERICAN: >60
GFR NON-AFRICAN AMERICAN: >60
GFR NON-AFRICAN AMERICAN: >60
GLUCOSE BLD-MCNC: 117 MG/DL (ref 70–99)
GLUCOSE BLD-MCNC: 121 MG/DL (ref 70–99)
HCT VFR BLD CALC: 35.8 % (ref 40.5–52.5)
HEMOGLOBIN: 12.1 G/DL (ref 13.5–17.5)
LYMPHOCYTES ABSOLUTE: 1.2 K/UL (ref 1–5.1)
LYMPHOCYTES RELATIVE PERCENT: 17.7 %
MAGNESIUM: 2.8 MG/DL (ref 1.8–2.4)
MCH RBC QN AUTO: 32.7 PG (ref 26–34)
MCHC RBC AUTO-ENTMCNC: 33.8 G/DL (ref 31–36)
MCV RBC AUTO: 96.6 FL (ref 80–100)
MONOCYTES ABSOLUTE: 1 K/UL (ref 0–1.3)
MONOCYTES RELATIVE PERCENT: 15.5 %
NEUTROPHILS ABSOLUTE: 4.3 K/UL (ref 1.7–7.7)
NEUTROPHILS RELATIVE PERCENT: 66.2 %
PDW BLD-RTO: 12.2 % (ref 12.4–15.4)
PLATELET # BLD: 214 K/UL (ref 135–450)
PMV BLD AUTO: 8.2 FL (ref 5–10.5)
POTASSIUM REFLEX MAGNESIUM: 3.1 MMOL/L (ref 3.5–5.1)
POTASSIUM SERPL-SCNC: 3 MMOL/L (ref 3.5–5.1)
RBC # BLD: 3.7 M/UL (ref 4.2–5.9)
SODIUM BLD-SCNC: 145 MMOL/L (ref 136–145)
SODIUM BLD-SCNC: 149 MMOL/L (ref 136–145)
WBC # BLD: 6.5 K/UL (ref 4–11)

## 2020-02-05 PROCEDURE — 6360000002 HC RX W HCPCS: Performed by: SURGERY

## 2020-02-05 PROCEDURE — 83735 ASSAY OF MAGNESIUM: CPT

## 2020-02-05 PROCEDURE — 36415 COLL VENOUS BLD VENIPUNCTURE: CPT

## 2020-02-05 PROCEDURE — 6360000002 HC RX W HCPCS: Performed by: NURSE PRACTITIONER

## 2020-02-05 PROCEDURE — 99024 POSTOP FOLLOW-UP VISIT: CPT | Performed by: SURGERY

## 2020-02-05 PROCEDURE — 6360000002 HC RX W HCPCS: Performed by: INTERNAL MEDICINE

## 2020-02-05 PROCEDURE — 2580000003 HC RX 258: Performed by: INTERNAL MEDICINE

## 2020-02-05 PROCEDURE — 1200000000 HC SEMI PRIVATE

## 2020-02-05 PROCEDURE — 85025 COMPLETE CBC W/AUTO DIFF WBC: CPT

## 2020-02-05 PROCEDURE — C9113 INJ PANTOPRAZOLE SODIUM, VIA: HCPCS | Performed by: INTERNAL MEDICINE

## 2020-02-05 PROCEDURE — 80048 BASIC METABOLIC PNL TOTAL CA: CPT

## 2020-02-05 PROCEDURE — APPSS45 APP SPLIT SHARED TIME 31-45 MINUTES: Performed by: CLINICAL NURSE SPECIALIST

## 2020-02-05 RX ORDER — PANTOPRAZOLE SODIUM 40 MG/10ML
40 INJECTION, POWDER, LYOPHILIZED, FOR SOLUTION INTRAVENOUS DAILY
Status: DISCONTINUED | OUTPATIENT
Start: 2020-02-05 | End: 2020-02-14 | Stop reason: HOSPADM

## 2020-02-05 RX ORDER — POTASSIUM CHLORIDE 7.45 MG/ML
10 INJECTION INTRAVENOUS PRN
Status: DISCONTINUED | OUTPATIENT
Start: 2020-02-05 | End: 2020-02-14 | Stop reason: HOSPADM

## 2020-02-05 RX ORDER — POTASSIUM CHLORIDE 20 MEQ/1
40 TABLET, EXTENDED RELEASE ORAL PRN
Status: DISCONTINUED | OUTPATIENT
Start: 2020-02-05 | End: 2020-02-14 | Stop reason: HOSPADM

## 2020-02-05 RX ORDER — POTASSIUM CHLORIDE 7.45 MG/ML
10 INJECTION INTRAVENOUS
Status: DISPENSED | OUTPATIENT
Start: 2020-02-05 | End: 2020-02-05

## 2020-02-05 RX ADMIN — LORAZEPAM 0.5 MG: 2 INJECTION, SOLUTION INTRAMUSCULAR; INTRAVENOUS at 00:55

## 2020-02-05 RX ADMIN — PANTOPRAZOLE SODIUM 40 MG: 40 INJECTION, POWDER, FOR SOLUTION INTRAVENOUS at 11:59

## 2020-02-05 RX ADMIN — HEPARIN SODIUM 5000 UNITS: 5000 INJECTION INTRAVENOUS; SUBCUTANEOUS at 05:40

## 2020-02-05 RX ADMIN — MORPHINE SULFATE 2 MG: 2 INJECTION, SOLUTION INTRAMUSCULAR; INTRAVENOUS at 20:57

## 2020-02-05 RX ADMIN — HEPARIN SODIUM 5000 UNITS: 5000 INJECTION INTRAVENOUS; SUBCUTANEOUS at 20:56

## 2020-02-05 RX ADMIN — ONDANSETRON 4 MG: 2 INJECTION INTRAMUSCULAR; INTRAVENOUS at 11:59

## 2020-02-05 RX ADMIN — POTASSIUM CHLORIDE 10 MEQ: 7.46 INJECTION, SOLUTION INTRAVENOUS at 19:06

## 2020-02-05 RX ADMIN — POTASSIUM CHLORIDE 10 MEQ: 7.46 INJECTION, SOLUTION INTRAVENOUS at 16:23

## 2020-02-05 RX ADMIN — MORPHINE SULFATE 2 MG: 2 INJECTION, SOLUTION INTRAMUSCULAR; INTRAVENOUS at 12:00

## 2020-02-05 RX ADMIN — POTASSIUM CHLORIDE 10 MEQ: 7.46 INJECTION, SOLUTION INTRAVENOUS at 00:22

## 2020-02-05 RX ADMIN — POTASSIUM CHLORIDE: 2 INJECTION, SOLUTION, CONCENTRATE INTRAVENOUS at 11:59

## 2020-02-05 RX ADMIN — POTASSIUM CHLORIDE 10 MEQ: 7.46 INJECTION, SOLUTION INTRAVENOUS at 12:01

## 2020-02-05 RX ADMIN — MORPHINE SULFATE 2 MG: 2 INJECTION, SOLUTION INTRAMUSCULAR; INTRAVENOUS at 16:23

## 2020-02-05 RX ADMIN — POTASSIUM CHLORIDE 10 MEQ: 7.46 INJECTION, SOLUTION INTRAVENOUS at 14:15

## 2020-02-05 RX ADMIN — HEPARIN SODIUM 5000 UNITS: 5000 INJECTION INTRAVENOUS; SUBCUTANEOUS at 16:23

## 2020-02-05 RX ADMIN — ONDANSETRON 4 MG: 2 INJECTION INTRAMUSCULAR; INTRAVENOUS at 20:57

## 2020-02-05 ASSESSMENT — PAIN DESCRIPTION - PAIN TYPE: TYPE: CHRONIC PAIN

## 2020-02-05 ASSESSMENT — PAIN DESCRIPTION - LOCATION: LOCATION: BACK

## 2020-02-05 ASSESSMENT — PAIN SCALES - GENERAL
PAINLEVEL_OUTOF10: 7
PAINLEVEL_OUTOF10: 10
PAINLEVEL_OUTOF10: 8

## 2020-02-05 ASSESSMENT — PAIN DESCRIPTION - ORIENTATION: ORIENTATION: LOWER

## 2020-02-05 NOTE — PROGRESS NOTES
Patient is alert and oriented, but was verbally aggressive;  VSS;  Shift assessment completed; Bed locked and in lowest position. Bedside table and call light within reach; Pt stated he was leaving today. Explanation was given to pt regarding care and the importance of following the orders given by doctor. Pt stated \"his PCP could see his information and that he did not have a SBO\" and that he wanted to leave and be under her care. Educated pt on the process as to what would happen if he did not d/c when he was medically stable, including going AMA and the risks associated with such choices regarding his health. Will continue to monitor.

## 2020-02-05 NOTE — PLAN OF CARE
Pt a/o, rates pain appropriately using 0-10 pain scale; pt calls out as needed for pain intervention; will continue to monitor and administer intervention as ordered and requested. Kirby Rodriguez

## 2020-02-05 NOTE — CARE COORDINATION
Chart reviewed day 3. Continues NGT clamping high output . SBFT pending. Consult to nephrology today. IPTA. Following.  Delilah Senior RN

## 2020-02-05 NOTE — CONSULTS
mL, 10 mL, Intravenous, PRN  heparin (porcine) injection 5,000 Units, 5,000 Units, Subcutaneous, 3 times per day       Vitals :     Vitals:    02/05/20 0825   BP: 104/64   Pulse: 74   Resp: 16   Temp: 98.1 °F (36.7 °C)   SpO2: 92%       I & O :       Intake/Output Summary (Last 24 hours) at 2/5/2020 1027  Last data filed at 2/5/2020 2061  Gross per 24 hour   Intake 3164 ml   Output 2675 ml   Net 489 ml        Physical Examination :     General appearance: Anxious- no, distressed- no, in good spirits- no  Lethargic, NG tube in place  HEENT: Lips- normal, teeth- ok , oral mucosa- moist  Neck : Mass- no, appears symmetrical, JVD- not visible  Respiratory: Respiratory effort-  normal, wheeze- no, crackles - no  Cardiovascular:  Ausculation- No M/R/G, Edema no  Abdomen: visible mass- no, distention- no, scar- no, tenderness- no                            hepatosplenomegaly-  no  Musculoskeletal:  clubbing no,cyanosis- no , digital ischemia- no                           muscle strength- grossly normal , tone - grossly normal  Skin: rashes- no , ulcers- no, induration- no, tightening - no  Psychiatric:  Judgement and insight- normal           AAO X 3  Additional finding: NG tube in place  Also has Stout     LABS:     Recent Labs     02/02/20  1747 02/03/20  0558 02/05/20  0543   WBC 13.4* 9.2 6.5   HGB 12.8* 11.7* 12.1*   HCT 38.1* 33.9* 35.8*    198 214     Recent Labs     02/04/20  1148 02/04/20  1817 02/04/20  2208 02/05/20  0543   * 151* 150* 149*   K 3.0* 3.2* 3.1* 3.1*    104 102 101   CO2 37* 36* 38* 38*   BUN 45* 43* 39* 35*   CREATININE 0.9 0.8 0.9 0.9   GLUCOSE 102* 105* 109* 117*   MG 2.80*  --  2.80* 2.80*

## 2020-02-05 NOTE — PROGRESS NOTES
[facility] SUR[facility]    PATIENT NAME: Rebeca Zamora     TODAY'S DATE: 2/5/2020    CHIEF COMPLAINT: constipation    INTERVAL HISTORY/HPI:    Pt states he is having flatus. Doesn't think he has a bowel obstruction. No BMs. Ngt output remains high     REVIEW OF SYSTEMS:  Pertinent positives and negatives as per interval history section    OBJECTIVE:  VITALS:  /64   Pulse 74   Temp 98.1 °F (36.7 °C) (Oral)   Resp 16   Ht 6' 1\" (1.854 m)   Wt 157 lb 3 oz (71.3 kg)   SpO2 92%   BMI 20.74 kg/m²     INTAKE/OUTPUT:    I/O last 3 completed shifts: In: 3224 [P. O.:60; I.V.:2773; NG/GT:30; IV Piggyback:361]  Out: 4027 [Urine:1025; Emesis/NG output:1650]  No intake/output data recorded. CONSTITUTIONAL:  awake and alert  LUNGS:  Respirations easy and unlabored, no crackles or wheezing  CARD:  regular rate and rhythm  ABDOMEN:  hypoactive bowel sounds, soft, non-distended, non-tender     Data:  CBC:   Recent Labs     02/02/20  1747 02/03/20  0558 02/05/20  0543   WBC 13.4* 9.2 6.5   HGB 12.8* 11.7* 12.1*   HCT 38.1* 33.9* 35.8*    198 214     BMP:    Recent Labs     02/04/20  1817 02/04/20 2208 02/05/20  0543   * 150* 149*   K 3.2* 3.1* 3.1*    102 101   CO2 36* 38* 38*   BUN 43* 39* 35*   CREATININE 0.8 0.9 0.9   GLUCOSE 105* 109* 117*     Hepatic:   Recent Labs     02/02/20  1747 02/03/20  0558   AST 31 41*   ALT 16 22   BILITOT 1.0 1.0   ALKPHOS 60 52     Mag:      Recent Labs     02/04/20  1148 02/04/20 2208 02/05/20  0543   MG 2.80* 2.80* 2.80*      Phos:   No results for input(s): PHOS in the last 72 hours. INR: No results for input(s): INR in the last 72 hours. Radiology Review:  *Imaging personally reviewed by me.    SBFT ordered    ASSESSMENT AND PLAN:  SBO: will obtain SBFT today to further evaluate     Nephrology consulted for electrolyte abnormalities     Electronically signed by JAMEY Trevino - CNP     Surgery Staff    I have examined this patient and read and agree with the note by Vandana Ellington CNP from today. If SBFT is normal, will remove NGT and start PO diet.     [employer]

## 2020-02-05 NOTE — PROGRESS NOTES
Hospitalist Progress Note      PCP: Elva Hill    Date of Admission: 2/2/2020    Chief Complaint: Abdominal pain, constipation and vomiting    Hospital Course: H & P  reviewed. Patient admitted with SBO and GEOVANNA    Subjective:     Patient had several hard stools  yesterday after enema. States he is passing flatus with no nausea, vomiting or abdominal pain. Pt insistent on going home today. Medications:  Reviewed    Infusion Medications    dextrose Stopped (02/04/20 1430)     Scheduled Medications    sodium chloride flush  10 mL Intravenous 2 times per day    heparin (porcine)  5,000 Units Subcutaneous 3 times per day     PRN Meds: potassium chloride **OR** potassium alternative oral replacement **OR** potassium chloride, LORazepam, phenol, morphine, ondansetron, sodium chloride flush      Intake/Output Summary (Last 24 hours) at 2/5/2020 0931  Last data filed at 2/5/2020 0520  Gross per 24 hour   Intake 3164 ml   Output 2675 ml   Net 489 ml       Physical Exam Performed:    /64   Pulse 74   Temp 98.1 °F (36.7 °C) (Oral)   Resp 16   Ht 6' 1\" (1.854 m)   Wt 157 lb 3 oz (71.3 kg)   SpO2 92%   BMI 20.74 kg/m²     General appearance: No apparent distress, appears stated age and cooperative. HEENT: Pupils equal, round,  Conjunctivae/corneas clear. Neck: Supple, with full range of motion. No jugular venous distention. Trachea midline. Respiratory:  Normal respiratory effort. Clear to auscultation, bilaterally without Rales/Wheezes/Rhonchi. Cardiovascular: Regular rate and rhythm with normal S1/S2 without murmurs, rubs or gallops. Abdomen: Soft, non-tender, non-distended with hypoactive bowel sounds. Musculoskeletal: No clubbing, cyanosis or edema bilaterally. Skin: Warm and dry  Neurologic: Alert, speech clear with no overt facial droop.     Psychiatric: Alert and oriented X3, thought content appropriate, limited insight as insistent on going home despite explanation of current ongoing care  Capillary Refill: Brisk,< 3 seconds   Peripheral Pulses: +2 palpable, equal bilaterally       Labs:   Recent Labs     02/02/20  1747 02/03/20  0558 02/05/20  0543   WBC 13.4* 9.2 6.5   HGB 12.8* 11.7* 12.1*   HCT 38.1* 33.9* 35.8*    198 214     Recent Labs     02/04/20  1817 02/04/20  2208 02/05/20  0543   * 150* 149*   K 3.2* 3.1* 3.1*    102 101   CO2 36* 38* 38*   BUN 43* 39* 35*   CREATININE 0.8 0.9 0.9   CALCIUM 8.5 8.6 8.8     Recent Labs     02/02/20 1747 02/03/20  0558   AST 31 41*   ALT 16 22   BILITOT 1.0 1.0   ALKPHOS 60 52     No results for input(s): INR in the last 72 hours. No results for input(s): Aditi Gong in the last 72 hours. Urinalysis:      Lab Results   Component Value Date    NITRU Negative 02/02/2020    BLOODU Negative 02/02/2020    SPECGRAV >=1.030 02/02/2020    GLUCOSEU Negative 02/02/2020       Radiology:  XR ABDOMEN (2 VIEWS)   Final Result   Nasogastric tube side hole overlies the distal esophagus with tip at the GE   junction. Several cm advancement recommended for optimal positioning. Gas and stool are seen throughout the colon. The previously seen dilated   fluid-filled loops of small bowel on CT are not well visualized on   radiographs. No free air. US RENAL COMPLETE   Final Result   1. No evidence of hydronephrosis. 2. Findings suggestive of presence of left-sided renal cysts, largest of   which measures up to approximately 4 cm in maximum transverse diameter as   described above. XR CHEST PORTABLE   Final Result   Enteric tube projects near the level of lower esophagus. Tube should be   advanced and reimaging should be considered to document appropriate   positioning. Left lower lung airspace disease suspicious of pneumonia versus atelectasis. CT ABDOMEN PELVIS WO CONTRAST Additional Contrast? None   Final Result   1. Small-bowel obstruction with a transition point likely in the pelvis. No   evidence of free air to suggest perforation. 2. Distended esophagus which is fluid filled likely related to reflux. 3. Stable position of Benito catheter in the urinary bladder. XR ABDOMEN (KUB) (SINGLE AP VIEW)   Final Result   1. Large amount of formed stool throughout the colon corresponding to given   history of constipation. 2. No bowel obstruction identified. Assessment/Plan:    Active Hospital Problems    Diagnosis    SBO (small bowel obstruction) ([facility] [K56.609]     Priority: High    GEOVANNA (acute kidney injury) ([facility] [facility] [N17.9]     Priority: Medium    Hypertension [I10]    Hyperlipidemia [E78.5]     SBO :  Gen Surg assisting -conservative management recommended. SBFT today. Continue NGT, NPO, IVF, pain control PRN. GEOVANNA : likely due to vol depletion, compounded by use of lisinopril, NSAIDs. Hold lisinopril and avoid NSAIDs. Improving with IVF. Renal US with no hydronephrosis , left renal cysts. Continue IVF and avoid nephrotoxins. Creatinine normalized. Resolved      Acute urinary retention: likely related to SBO. Improved s/p benito placement. Hypernatremia: Likely due to volume depletion, n.p.o. status. Nephro assisting. Continue IVF per nephro recs        Hypokalemia: Likely due to GI losses. Mag normal.  Monitoring and replacing as needed      Hyperlipidemia: resume statin when no longer NPO. HTN: held lisinopril due to GEOVANNA. Left renal cysts: noted on renal US. Will need non emergent CT with contrast for further characterization of cysts when his GEOVANNA resolves which can be done outpatient and followed up by his PCP. Insomnia: usually on  klonipin nightly at home but unable to take as NPO.   Continue dose  IV ativan PRN QHS       DVT Prophylaxis: Heparin  Diet: Diet NPO Effective Now  Code Status: Full Code    PT/OT Eval Status: Not indicated at this time    Dispo - hard to say,  pending clinical course      Lázaro Davison MD

## 2020-02-05 NOTE — PLAN OF CARE
Problem: Pain:  Goal: Pain level will decrease  Description  Pain level will decrease  Outcome: Ongoing  Goal: Control of acute pain  Description  Control of acute pain

## 2020-02-05 NOTE — PROGRESS NOTES
Spoke with Dr. Kat Barton and updated on lab values. Orders 2 more bags of potassium and to wait for morning labs. Will continue to monitor.

## 2020-02-06 ENCOUNTER — APPOINTMENT (OUTPATIENT)
Dept: GENERAL RADIOLOGY | Age: 80
DRG: 335 | End: 2020-02-06
Payer: MEDICARE

## 2020-02-06 ENCOUNTER — ANESTHESIA EVENT (OUTPATIENT)
Dept: OPERATING ROOM | Age: 80
DRG: 335 | End: 2020-02-06
Payer: MEDICARE

## 2020-02-06 ENCOUNTER — ANESTHESIA (OUTPATIENT)
Dept: OPERATING ROOM | Age: 80
DRG: 335 | End: 2020-02-06
Payer: MEDICARE

## 2020-02-06 VITALS
RESPIRATION RATE: 1 BRPM | SYSTOLIC BLOOD PRESSURE: 131 MMHG | OXYGEN SATURATION: 97 % | TEMPERATURE: 97.7 F | DIASTOLIC BLOOD PRESSURE: 70 MMHG

## 2020-02-06 LAB
ANION GAP SERPL CALCULATED.3IONS-SCNC: 10 MMOL/L (ref 3–16)
BASOPHILS ABSOLUTE: 0 K/UL (ref 0–0.2)
BASOPHILS RELATIVE PERCENT: 0.2 %
BUN BLDV-MCNC: 26 MG/DL (ref 7–20)
CALCIUM SERPL-MCNC: 8.2 MG/DL (ref 8.3–10.6)
CHLORIDE BLD-SCNC: 95 MMOL/L (ref 99–110)
CO2: 36 MMOL/L (ref 21–32)
CREAT SERPL-MCNC: 0.9 MG/DL (ref 0.8–1.3)
EOSINOPHILS ABSOLUTE: 0 K/UL (ref 0–0.6)
EOSINOPHILS RELATIVE PERCENT: 0.5 %
GFR AFRICAN AMERICAN: >60
GFR NON-AFRICAN AMERICAN: >60
GLUCOSE BLD-MCNC: 134 MG/DL (ref 70–99)
HCT VFR BLD CALC: 35.5 % (ref 40.5–52.5)
HEMOGLOBIN: 12 G/DL (ref 13.5–17.5)
LYMPHOCYTES ABSOLUTE: 1.3 K/UL (ref 1–5.1)
LYMPHOCYTES RELATIVE PERCENT: 14.7 %
MAGNESIUM: 2.4 MG/DL (ref 1.8–2.4)
MCH RBC QN AUTO: 32.6 PG (ref 26–34)
MCHC RBC AUTO-ENTMCNC: 33.9 G/DL (ref 31–36)
MCV RBC AUTO: 96.2 FL (ref 80–100)
MONOCYTES ABSOLUTE: 1.2 K/UL (ref 0–1.3)
MONOCYTES RELATIVE PERCENT: 13.4 %
NEUTROPHILS ABSOLUTE: 6.3 K/UL (ref 1.7–7.7)
NEUTROPHILS RELATIVE PERCENT: 71.2 %
PDW BLD-RTO: 12 % (ref 12.4–15.4)
PLATELET # BLD: 219 K/UL (ref 135–450)
PMV BLD AUTO: 8.4 FL (ref 5–10.5)
POTASSIUM REFLEX MAGNESIUM: 3.2 MMOL/L (ref 3.5–5.1)
RBC # BLD: 3.68 M/UL (ref 4.2–5.9)
SODIUM BLD-SCNC: 141 MMOL/L (ref 136–145)
WBC # BLD: 8.8 K/UL (ref 4–11)

## 2020-02-06 PROCEDURE — 6360000002 HC RX W HCPCS: Performed by: ANESTHESIOLOGY

## 2020-02-06 PROCEDURE — 7100000000 HC PACU RECOVERY - FIRST 15 MIN: Performed by: SURGERY

## 2020-02-06 PROCEDURE — C9113 INJ PANTOPRAZOLE SODIUM, VIA: HCPCS | Performed by: INTERNAL MEDICINE

## 2020-02-06 PROCEDURE — 80048 BASIC METABOLIC PNL TOTAL CA: CPT

## 2020-02-06 PROCEDURE — 6360000002 HC RX W HCPCS: Performed by: NURSE PRACTITIONER

## 2020-02-06 PROCEDURE — 2580000003 HC RX 258: Performed by: INTERNAL MEDICINE

## 2020-02-06 PROCEDURE — 2580000003 HC RX 258: Performed by: SURGERY

## 2020-02-06 PROCEDURE — 83735 ASSAY OF MAGNESIUM: CPT

## 2020-02-06 PROCEDURE — 6370000000 HC RX 637 (ALT 250 FOR IP)

## 2020-02-06 PROCEDURE — APPSS45 APP SPLIT SHARED TIME 31-45 MINUTES: Performed by: CLINICAL NURSE SPECIALIST

## 2020-02-06 PROCEDURE — 6360000002 HC RX W HCPCS: Performed by: NURSE ANESTHETIST, CERTIFIED REGISTERED

## 2020-02-06 PROCEDURE — 2500000003 HC RX 250 WO HCPCS: Performed by: NURSE ANESTHETIST, CERTIFIED REGISTERED

## 2020-02-06 PROCEDURE — 2580000003 HC RX 258: Performed by: NURSE PRACTITIONER

## 2020-02-06 PROCEDURE — 2580000003 HC RX 258: Performed by: NURSE ANESTHETIST, CERTIFIED REGISTERED

## 2020-02-06 PROCEDURE — 44005 FREEING OF BOWEL ADHESION: CPT | Performed by: SURGERY

## 2020-02-06 PROCEDURE — 85025 COMPLETE CBC W/AUTO DIFF WBC: CPT

## 2020-02-06 PROCEDURE — 2700000000 HC OXYGEN THERAPY PER DAY

## 2020-02-06 PROCEDURE — 0DN80ZZ RELEASE SMALL INTESTINE, OPEN APPROACH: ICD-10-PCS | Performed by: SURGERY

## 2020-02-06 PROCEDURE — 6360000002 HC RX W HCPCS: Performed by: INTERNAL MEDICINE

## 2020-02-06 PROCEDURE — 94761 N-INVAS EAR/PLS OXIMETRY MLT: CPT

## 2020-02-06 PROCEDURE — 6360000002 HC RX W HCPCS: Performed by: SURGERY

## 2020-02-06 PROCEDURE — 3700000000 HC ANESTHESIA ATTENDED CARE: Performed by: SURGERY

## 2020-02-06 PROCEDURE — 2709999900 HC NON-CHARGEABLE SUPPLY: Performed by: SURGERY

## 2020-02-06 PROCEDURE — 7100000001 HC PACU RECOVERY - ADDTL 15 MIN: Performed by: SURGERY

## 2020-02-06 PROCEDURE — 3600000004 HC SURGERY LEVEL 4 BASE: Performed by: SURGERY

## 2020-02-06 PROCEDURE — 99232 SBSQ HOSP IP/OBS MODERATE 35: CPT | Performed by: SURGERY

## 2020-02-06 PROCEDURE — 3700000001 HC ADD 15 MINUTES (ANESTHESIA): Performed by: SURGERY

## 2020-02-06 PROCEDURE — C1765 ADHESION BARRIER: HCPCS | Performed by: SURGERY

## 2020-02-06 PROCEDURE — 1200000000 HC SEMI PRIVATE

## 2020-02-06 PROCEDURE — 3600000014 HC SURGERY LEVEL 4 ADDTL 15MIN: Performed by: SURGERY

## 2020-02-06 RX ORDER — ROCURONIUM BROMIDE 10 MG/ML
INJECTION, SOLUTION INTRAVENOUS PRN
Status: DISCONTINUED | OUTPATIENT
Start: 2020-02-06 | End: 2020-02-06 | Stop reason: SDUPTHER

## 2020-02-06 RX ORDER — SUCCINYLCHOLINE CHLORIDE 20 MG/ML
INJECTION INTRAMUSCULAR; INTRAVENOUS PRN
Status: DISCONTINUED | OUTPATIENT
Start: 2020-02-06 | End: 2020-02-06 | Stop reason: SDUPTHER

## 2020-02-06 RX ORDER — SODIUM CHLORIDE, SODIUM LACTATE, POTASSIUM CHLORIDE, CALCIUM CHLORIDE 600; 310; 30; 20 MG/100ML; MG/100ML; MG/100ML; MG/100ML
INJECTION, SOLUTION INTRAVENOUS CONTINUOUS PRN
Status: DISCONTINUED | OUTPATIENT
Start: 2020-02-06 | End: 2020-02-06 | Stop reason: SDUPTHER

## 2020-02-06 RX ORDER — ONDANSETRON 2 MG/ML
INJECTION INTRAMUSCULAR; INTRAVENOUS PRN
Status: DISCONTINUED | OUTPATIENT
Start: 2020-02-06 | End: 2020-02-06 | Stop reason: SDUPTHER

## 2020-02-06 RX ORDER — PROPOFOL 10 MG/ML
INJECTION, EMULSION INTRAVENOUS PRN
Status: DISCONTINUED | OUTPATIENT
Start: 2020-02-06 | End: 2020-02-06 | Stop reason: SDUPTHER

## 2020-02-06 RX ORDER — ONDANSETRON 2 MG/ML
4 INJECTION INTRAMUSCULAR; INTRAVENOUS
Status: DISCONTINUED | OUTPATIENT
Start: 2020-02-06 | End: 2020-02-06 | Stop reason: HOSPADM

## 2020-02-06 RX ORDER — SODIUM CHLORIDE, SODIUM LACTATE, POTASSIUM CHLORIDE, CALCIUM CHLORIDE 600; 310; 30; 20 MG/100ML; MG/100ML; MG/100ML; MG/100ML
INJECTION, SOLUTION INTRAVENOUS CONTINUOUS
Status: CANCELLED | OUTPATIENT
Start: 2020-02-06

## 2020-02-06 RX ORDER — OXYCODONE HYDROCHLORIDE AND ACETAMINOPHEN 5; 325 MG/1; MG/1
1 TABLET ORAL PRN
Status: DISCONTINUED | OUTPATIENT
Start: 2020-02-06 | End: 2020-02-06 | Stop reason: HOSPADM

## 2020-02-06 RX ORDER — EPHEDRINE SULFATE 50 MG/ML
INJECTION, SOLUTION INTRAVENOUS PRN
Status: DISCONTINUED | OUTPATIENT
Start: 2020-02-06 | End: 2020-02-06 | Stop reason: SDUPTHER

## 2020-02-06 RX ORDER — OXYCODONE HYDROCHLORIDE AND ACETAMINOPHEN 5; 325 MG/1; MG/1
2 TABLET ORAL PRN
Status: DISCONTINUED | OUTPATIENT
Start: 2020-02-06 | End: 2020-02-06 | Stop reason: HOSPADM

## 2020-02-06 RX ORDER — MORPHINE SULFATE 2 MG/ML
2 INJECTION, SOLUTION INTRAMUSCULAR; INTRAVENOUS EVERY 5 MIN PRN
Status: DISCONTINUED | OUTPATIENT
Start: 2020-02-06 | End: 2020-02-06 | Stop reason: HOSPADM

## 2020-02-06 RX ORDER — POTASSIUM CHLORIDE 7.45 MG/ML
10 INJECTION INTRAVENOUS
Status: COMPLETED | OUTPATIENT
Start: 2020-02-06 | End: 2020-02-06

## 2020-02-06 RX ORDER — MORPHINE SULFATE 2 MG/ML
1 INJECTION, SOLUTION INTRAMUSCULAR; INTRAVENOUS EVERY 5 MIN PRN
Status: DISCONTINUED | OUTPATIENT
Start: 2020-02-06 | End: 2020-02-06 | Stop reason: HOSPADM

## 2020-02-06 RX ORDER — LIDOCAINE HYDROCHLORIDE 20 MG/ML
INJECTION, SOLUTION EPIDURAL; INFILTRATION; INTRACAUDAL; PERINEURAL PRN
Status: DISCONTINUED | OUTPATIENT
Start: 2020-02-06 | End: 2020-02-06 | Stop reason: SDUPTHER

## 2020-02-06 RX ORDER — MAGNESIUM HYDROXIDE 1200 MG/15ML
LIQUID ORAL CONTINUOUS PRN
Status: COMPLETED | OUTPATIENT
Start: 2020-02-06 | End: 2020-02-06

## 2020-02-06 RX ORDER — SODIUM CHLORIDE, SODIUM LACTATE, POTASSIUM CHLORIDE, CALCIUM CHLORIDE 600; 310; 30; 20 MG/100ML; MG/100ML; MG/100ML; MG/100ML
INJECTION, SOLUTION INTRAVENOUS
Status: DISPENSED
Start: 2020-02-06 | End: 2020-02-07

## 2020-02-06 RX ORDER — MORPHINE SULFATE 2 MG/ML
2 INJECTION, SOLUTION INTRAMUSCULAR; INTRAVENOUS
Status: DISCONTINUED | OUTPATIENT
Start: 2020-02-06 | End: 2020-02-14 | Stop reason: HOSPADM

## 2020-02-06 RX ORDER — PHENYLEPHRINE HCL IN 0.9% NACL 1 MG/10 ML
SYRINGE (ML) INTRAVENOUS PRN
Status: DISCONTINUED | OUTPATIENT
Start: 2020-02-06 | End: 2020-02-06 | Stop reason: SDUPTHER

## 2020-02-06 RX ORDER — MORPHINE SULFATE 4 MG/ML
4 INJECTION, SOLUTION INTRAMUSCULAR; INTRAVENOUS
Status: DISCONTINUED | OUTPATIENT
Start: 2020-02-06 | End: 2020-02-14 | Stop reason: HOSPADM

## 2020-02-06 RX ORDER — FENTANYL CITRATE 50 UG/ML
INJECTION, SOLUTION INTRAMUSCULAR; INTRAVENOUS PRN
Status: DISCONTINUED | OUTPATIENT
Start: 2020-02-06 | End: 2020-02-06 | Stop reason: SDUPTHER

## 2020-02-06 RX ORDER — SODIUM CHLORIDE 0.9 % (FLUSH) 0.9 %
10 SYRINGE (ML) INJECTION EVERY 12 HOURS SCHEDULED
Status: CANCELLED | OUTPATIENT
Start: 2020-02-06

## 2020-02-06 RX ORDER — DEXAMETHASONE SODIUM PHOSPHATE 4 MG/ML
INJECTION, SOLUTION INTRA-ARTICULAR; INTRALESIONAL; INTRAMUSCULAR; INTRAVENOUS; SOFT TISSUE PRN
Status: DISCONTINUED | OUTPATIENT
Start: 2020-02-06 | End: 2020-02-06 | Stop reason: SDUPTHER

## 2020-02-06 RX ORDER — SODIUM CHLORIDE 0.9 % (FLUSH) 0.9 %
10 SYRINGE (ML) INJECTION PRN
Status: CANCELLED | OUTPATIENT
Start: 2020-02-06

## 2020-02-06 RX ADMIN — DEXAMETHASONE SODIUM PHOSPHATE 4 MG: 4 INJECTION, SOLUTION INTRAMUSCULAR; INTRAVENOUS at 15:19

## 2020-02-06 RX ADMIN — ROCURONIUM BROMIDE 5 MG: 10 SOLUTION INTRAVENOUS at 14:56

## 2020-02-06 RX ADMIN — EPHEDRINE SULFATE 5 MG: 50 INJECTION INTRAMUSCULAR; INTRAVENOUS; SUBCUTANEOUS at 15:05

## 2020-02-06 RX ADMIN — POTASSIUM CHLORIDE: 2 INJECTION, SOLUTION, CONCENTRATE INTRAVENOUS at 09:18

## 2020-02-06 RX ADMIN — ROCURONIUM BROMIDE 10 MG: 10 SOLUTION INTRAVENOUS at 15:48

## 2020-02-06 RX ADMIN — ONDANSETRON 4 MG: 2 INJECTION INTRAMUSCULAR; INTRAVENOUS at 10:07

## 2020-02-06 RX ADMIN — Medication 10 ML: at 20:55

## 2020-02-06 RX ADMIN — SUGAMMADEX 200 MG: 100 INJECTION, SOLUTION INTRAVENOUS at 16:21

## 2020-02-06 RX ADMIN — EPHEDRINE SULFATE 10 MG: 50 INJECTION INTRAMUSCULAR; INTRAVENOUS; SUBCUTANEOUS at 15:00

## 2020-02-06 RX ADMIN — POTASSIUM CHLORIDE 10 MEQ: 7.46 INJECTION, SOLUTION INTRAVENOUS at 13:34

## 2020-02-06 RX ADMIN — PROPOFOL 100 MG: 10 INJECTION, EMULSION INTRAVENOUS at 14:56

## 2020-02-06 RX ADMIN — POTASSIUM CHLORIDE 10 MEQ: 7.46 INJECTION, SOLUTION INTRAVENOUS at 09:18

## 2020-02-06 RX ADMIN — LIDOCAINE HYDROCHLORIDE 50 MG: 20 INJECTION, SOLUTION EPIDURAL; INFILTRATION; INTRACAUDAL; PERINEURAL at 14:56

## 2020-02-06 RX ADMIN — Medication 80 MCG: at 15:58

## 2020-02-06 RX ADMIN — MORPHINE SULFATE 2 MG: 2 INJECTION, SOLUTION INTRAMUSCULAR; INTRAVENOUS at 10:07

## 2020-02-06 RX ADMIN — EPHEDRINE SULFATE 5 MG: 50 INJECTION INTRAMUSCULAR; INTRAVENOUS; SUBCUTANEOUS at 15:42

## 2020-02-06 RX ADMIN — Medication 10 ML: at 10:08

## 2020-02-06 RX ADMIN — LORAZEPAM 0.5 MG: 2 INJECTION, SOLUTION INTRAMUSCULAR; INTRAVENOUS at 00:21

## 2020-02-06 RX ADMIN — CEFAZOLIN 2 G: 1 INJECTION, POWDER, FOR SOLUTION INTRAMUSCULAR; INTRAVENOUS at 15:01

## 2020-02-06 RX ADMIN — Medication: at 19:40

## 2020-02-06 RX ADMIN — SODIUM CHLORIDE, POTASSIUM CHLORIDE, SODIUM LACTATE AND CALCIUM CHLORIDE: 600; 310; 30; 20 INJECTION, SOLUTION INTRAVENOUS at 14:47

## 2020-02-06 RX ADMIN — SUCCINYLCHOLINE CHLORIDE 120 MG: 20 INJECTION, SOLUTION INTRAMUSCULAR; INTRAVENOUS at 14:56

## 2020-02-06 RX ADMIN — FENTANYL CITRATE 50 MCG: 50 INJECTION, SOLUTION INTRAMUSCULAR; INTRAVENOUS at 15:24

## 2020-02-06 RX ADMIN — ONDANSETRON 4 MG: 2 INJECTION INTRAMUSCULAR; INTRAVENOUS at 15:20

## 2020-02-06 RX ADMIN — POTASSIUM CHLORIDE 10 MEQ: 7.46 INJECTION, SOLUTION INTRAVENOUS at 11:43

## 2020-02-06 RX ADMIN — PANTOPRAZOLE SODIUM 40 MG: 40 INJECTION, POWDER, FOR SOLUTION INTRAVENOUS at 10:07

## 2020-02-06 RX ADMIN — HYDROMORPHONE HYDROCHLORIDE 0.5 MG: 1 INJECTION, SOLUTION INTRAMUSCULAR; INTRAVENOUS; SUBCUTANEOUS at 17:38

## 2020-02-06 RX ADMIN — EPHEDRINE SULFATE 5 MG: 50 INJECTION INTRAMUSCULAR; INTRAVENOUS; SUBCUTANEOUS at 15:53

## 2020-02-06 RX ADMIN — POTASSIUM CHLORIDE: 2 INJECTION, SOLUTION, CONCENTRATE INTRAVENOUS at 00:22

## 2020-02-06 RX ADMIN — POTASSIUM CHLORIDE 10 MEQ: 7.46 INJECTION, SOLUTION INTRAVENOUS at 07:46

## 2020-02-06 RX ADMIN — EPHEDRINE SULFATE 10 MG: 50 INJECTION INTRAMUSCULAR; INTRAVENOUS; SUBCUTANEOUS at 15:47

## 2020-02-06 RX ADMIN — FENTANYL CITRATE 50 MCG: 50 INJECTION, SOLUTION INTRAMUSCULAR; INTRAVENOUS at 14:56

## 2020-02-06 RX ADMIN — MORPHINE SULFATE 4 MG: 4 INJECTION, SOLUTION INTRAMUSCULAR; INTRAVENOUS at 20:55

## 2020-02-06 RX ADMIN — HYDROMORPHONE HYDROCHLORIDE 0.5 MG: 1 INJECTION, SOLUTION INTRAMUSCULAR; INTRAVENOUS; SUBCUTANEOUS at 17:19

## 2020-02-06 RX ADMIN — ROCURONIUM BROMIDE 35 MG: 10 SOLUTION INTRAVENOUS at 15:04

## 2020-02-06 RX ADMIN — ONDANSETRON 4 MG: 2 INJECTION INTRAMUSCULAR; INTRAVENOUS at 20:55

## 2020-02-06 ASSESSMENT — PULMONARY FUNCTION TESTS
PIF_VALUE: 17
PIF_VALUE: 14
PIF_VALUE: 15
PIF_VALUE: 16
PIF_VALUE: 15
PIF_VALUE: 17
PIF_VALUE: 0
PIF_VALUE: 17
PIF_VALUE: 3
PIF_VALUE: 18
PIF_VALUE: 14
PIF_VALUE: 16
PIF_VALUE: 15
PIF_VALUE: 15
PIF_VALUE: 11
PIF_VALUE: 17
PIF_VALUE: 16
PIF_VALUE: 15
PIF_VALUE: 16
PIF_VALUE: 15
PIF_VALUE: 15
PIF_VALUE: 16
PIF_VALUE: 16
PIF_VALUE: 14
PIF_VALUE: 15
PIF_VALUE: 14
PIF_VALUE: 10
PIF_VALUE: 15
PIF_VALUE: 15
PIF_VALUE: 14
PIF_VALUE: 3
PIF_VALUE: 1
PIF_VALUE: 16
PIF_VALUE: 5
PIF_VALUE: 14
PIF_VALUE: 14
PIF_VALUE: 15
PIF_VALUE: 15
PIF_VALUE: 17
PIF_VALUE: 17
PIF_VALUE: 15
PIF_VALUE: 6
PIF_VALUE: 16
PIF_VALUE: 15
PIF_VALUE: 13
PIF_VALUE: 16
PIF_VALUE: 18
PIF_VALUE: 16
PIF_VALUE: 15
PIF_VALUE: 16
PIF_VALUE: 15
PIF_VALUE: 17
PIF_VALUE: 0
PIF_VALUE: 10
PIF_VALUE: 16
PIF_VALUE: 13
PIF_VALUE: 16
PIF_VALUE: 15
PIF_VALUE: 15
PIF_VALUE: 17
PIF_VALUE: 4
PIF_VALUE: 15
PIF_VALUE: 19
PIF_VALUE: 15
PIF_VALUE: 14
PIF_VALUE: 15
PIF_VALUE: 15
PIF_VALUE: 17
PIF_VALUE: 16
PIF_VALUE: 15
PIF_VALUE: 14
PIF_VALUE: 14
PIF_VALUE: 1
PIF_VALUE: 0
PIF_VALUE: 13
PIF_VALUE: 10
PIF_VALUE: 0
PIF_VALUE: 16
PIF_VALUE: 15
PIF_VALUE: 4
PIF_VALUE: 3
PIF_VALUE: 3
PIF_VALUE: 18
PIF_VALUE: 15
PIF_VALUE: 15
PIF_VALUE: 14
PIF_VALUE: 15
PIF_VALUE: 13
PIF_VALUE: 14
PIF_VALUE: 22
PIF_VALUE: 15
PIF_VALUE: 15
PIF_VALUE: 14
PIF_VALUE: 2
PIF_VALUE: 17
PIF_VALUE: 0
PIF_VALUE: 16
PIF_VALUE: 12
PIF_VALUE: 15
PIF_VALUE: 14
PIF_VALUE: 1
PIF_VALUE: 16
PIF_VALUE: 14
PIF_VALUE: 15
PIF_VALUE: 4
PIF_VALUE: 0
PIF_VALUE: 2
PIF_VALUE: 15
PIF_VALUE: 10
PIF_VALUE: 15

## 2020-02-06 ASSESSMENT — PAIN SCALES - WONG BAKER
WONGBAKER_NUMERICALRESPONSE: 8
WONGBAKER_NUMERICALRESPONSE: 2

## 2020-02-06 ASSESSMENT — LIFESTYLE VARIABLES: SMOKING_STATUS: 1

## 2020-02-06 ASSESSMENT — PAIN DESCRIPTION - PAIN TYPE: TYPE: SURGICAL PAIN

## 2020-02-06 ASSESSMENT — PAIN SCALES - GENERAL
PAINLEVEL_OUTOF10: 7
PAINLEVEL_OUTOF10: 8
PAINLEVEL_OUTOF10: 8
PAINLEVEL_OUTOF10: 5
PAINLEVEL_OUTOF10: 7

## 2020-02-06 ASSESSMENT — PAIN DESCRIPTION - LOCATION: LOCATION: ABDOMEN

## 2020-02-06 ASSESSMENT — ENCOUNTER SYMPTOMS: SHORTNESS OF BREATH: 0

## 2020-02-06 NOTE — PROGRESS NOTES
transition point likely in the pelvis. No   evidence of free air to suggest perforation. 2. Distended esophagus which is fluid filled likely related to reflux. 3. Stable position of Benito catheter in the urinary bladder. XR ABDOMEN (KUB) (SINGLE AP VIEW)   Final Result   1. Large amount of formed stool throughout the colon corresponding to given   history of constipation. 2. No bowel obstruction identified. XR ABDOMEN (2 VIEWS)    (Results Pending)           Assessment/Plan:    Active Hospital Problems    Diagnosis    SBO (small bowel obstruction) ([facility] [address] [K56.609]     Priority: High    GEOVANNA (acute kidney injury) ([facility] [address] [N17.9]     Priority: Medium    Hypertension [I10]    Hyperlipidemia [E78.5]     SBO :  Gen Surg assisting. Pt failed conservative management with plans for surgery today. Continue  NPO, IVF, pain control PRN. GEOVANNA : likely due to vol depletion, compounded by use of lisinopril, NSAIDs. Hold lisinopril and avoid NSAIDs. Improving with IVF. Renal US with no hydronephrosis , left renal cysts. Continue IVF and avoid nephrotoxins. Creatinine normalized. Resolved      Acute urinary retention: likely related to SBO. Improved s/p benito placement. Hypernatremia: Likely due to volume depletion, n.p.o. status. Improving with IVF  Mgt per nephro. Hypokalemia: Likely due to GI losses. Mag normal.  Monitoring and replacing as needed      Hyperlipidemia: resume statin when no longer NPO. HTN: held lisinopril due to GEOVANNA. Left renal cysts: noted on renal US. Will need non emergent CT with contrast for further characterization of cysts when his GEOVANNA resolves which can be done outpatient and followed up by his PCP. Insomnia: usually on  klonipin nightly at home but unable to take as NPO.   Continue dose  IV ativan PRN QHS       DVT Prophylaxis: Heparin  Diet: Diet NPO Effective Now  Code Status: Full Code    PT/OT Eval Status: Not indicated at this time    Dispo - hard to say,  pending clinical course      Allyssa Pedersen MD

## 2020-02-06 NOTE — PROGRESS NOTES
Pt axo. Assessment completed as charted. Pt denies need for pain medications at this time. Admits to mild nausea, denies need for antiemetics at this time. MD paged and aware of NG tube removed by pt. Will wait on replacement at this time. Will continue to monitor. Call light in reach, bed check on and active, family at bedside.

## 2020-02-06 NOTE — ANESTHESIA PRE PROCEDURE
PRN Mack Almeida  mL/hr at 02/05/20 1623 10 mEq at 02/05/20 1623    pantoprazole (PROTONIX) injection 40 mg  40 mg Intravenous Daily Luisa Myers MD   40 mg at 02/06/20 1007    potassium chloride 20 mEq in dextrose 5 % 1,000 mL infusion   Intravenous Continuous Luisa Myers MD 80 mL/hr at 02/06/20 0918      enoxaparin (LOVENOX) injection 40 mg  40 mg Subcutaneous Daily Adiel Muñoz MD   Stopped at 02/06/20 1008    LORazepam (ATIVAN) injection 0.5 mg  0.5 mg Intravenous Nightly PRN Anna Alexis MD   0.5 mg at 02/06/20 0021    phenol 1.4 % mouth spray 1 spray  1 spray Mouth/Throat Q2H PRN Lida Carrasco MD   1 spray at 02/04/20 0800    morphine (PF) injection 2 mg  2 mg Intravenous Q4H PRN Anna Alexis MD   2 mg at 02/06/20 1007    ondansetron (ZOFRAN) injection 4 mg  4 mg Intravenous Q6H PRN Sage Corpus, APRN - CNP   4 mg at 02/06/20 1007    sodium chloride flush 0.9 % injection 10 mL  10 mL Intravenous 2 times per day Sage Corpus, APRN - CNP   10 mL at 02/06/20 1008    sodium chloride flush 0.9 % injection 10 mL  10 mL Intravenous PRN Sage Corpus, APRN - CNP           Allergies:  No Known Allergies    Problem List:    Patient Active Problem List   Diagnosis Code    Hypertension I10    Hyperlipidemia E78.5    GEOVANNA (acute kidney injury) ([facility] [address] N17.9    [facility] K56.609       Past Medical History:        Diagnosis Date    Hyperlipidemia     Hypertension        Past Surgical History:  History reviewed. No pertinent surgical history.     Social History:    Social History     Tobacco Use    Smoking status: Current Some Day Smoker    Smokeless tobacco: Never Used   Substance Use Topics    Alcohol use: Yes     Comment: a few times a week                                Ready to quit: Not Answered  Counseling given: Not Answered      Vital Signs (Current):   Vitals:    02/05/20 2013 02/06/20 0025 02/06/20 0903 02/06/20 1400   BP: 107/64 110/67 (!) 94/59 97/61   Pulse: 73 68 84 81   Resp: 16 18 18 16   Temp: 98.2 °F (36.8 °C) 98.7 °F (37.1 °C) 97.8 °F (36.6 °C) 98.3 °F (36.8 °C)   TempSrc: Oral Oral Oral Oral   SpO2: 92% 93% 92% 93%   Weight:       Height:                                                  BP Readings from Last 3 Encounters:   02/06/20 97/61   03/14/18 122/74       NPO Status:  mn+, see mar                                                                               BMI:   Wt Readings from Last 3 Encounters:   02/02/20 157 lb 3 oz (71.3 kg)   03/14/18 160 lb (72.6 kg)     Body mass index is 20.74 kg/m². CBC:   Lab Results   Component Value Date    WBC 8.8 02/06/2020    RBC 3.68 02/06/2020    HGB 12.0 02/06/2020    HCT 35.5 02/06/2020    MCV 96.2 02/06/2020    RDW 12.0 02/06/2020     02/06/2020       CMP:   Lab Results   Component Value Date     02/06/2020    K 3.2 02/06/2020    CL 95 02/06/2020    CO2 36 02/06/2020    BUN 26 02/06/2020    CREATININE 0.9 02/06/2020    GFRAA >60 02/06/2020    AGRATIO 1.4 02/03/2020    LABGLOM >60 02/06/2020    GLUCOSE 134 02/06/2020    PROT 6.1 02/03/2020    CALCIUM 8.2 02/06/2020    BILITOT 1.0 02/03/2020    ALKPHOS 52 02/03/2020    AST 41 02/03/2020    ALT 22 02/03/2020       POC Tests: No results for input(s): POCGLU, POCNA, POCK, POCCL, POCBUN, POCHEMO, POCHCT in the last 72 hours.     Coags: No results found for: PROTIME, INR, APTT    HCG (If Applicable): No results found for: PREGTESTUR, PREGSERUM, HCG, HCGQUANT     ABGs: No results found for: PHART, PO2ART, KDR8AYB, VVE2ACD, BEART, O4HIQWQR     Type & Screen (If Applicable):  No results found for: LABABO, [address]    Anesthesia Evaluation  Patient summary reviewed and Nursing notes reviewed no history of anesthetic complications:   Airway: Mallampati: II  TM distance: >3 FB   Neck ROM: limited  Mouth opening: > = 3 FB Dental:          Pulmonary:   (+) current smoker (1/2 ppd)    (-) COPD, asthma and shortness of breath          Patient did not smoke on day of surgery. Cardiovascular:    (+) hypertension:, valvular problems/murmurs: MVP, murmur (2/6), hyperlipidemia    (-) pacemaker, past MI, CABG/stent, dysrhythmias,  angina and  CHF    ECG reviewed  Rhythm: regular  Rate: normal  Echocardiogram reviewed         Beta Blocker:  Not on Beta Blocker         Neuro/Psych:   Negative Neuro/Psych ROS     (-) seizures, TIA and CVA           GI/Hepatic/Renal:   (+) renal disease (GEOVANNA, RESOLVNG, HYPOVOL.):,      (-) GERD, liver disease, bowel prep and no morbid obesity       Endo/Other:    (+) : arthritis:., electrolyte abnormalities, malignancy/cancer (testicular). (-) diabetes mellitus, hypothyroidism, hyperthyroidism               Abdominal:           Vascular: negative vascular ROS. Anesthesia Plan      general     ASA 3       Induction: intravenous and rapid sequence. MIPS: Postoperative opioids intended and Prophylactic antiemetics administered. Anesthetic plan and risks discussed with patient and child/children. Plan discussed with CRNA. This pre-anesthesia assessment may be used as a history and physical.    DOS STAFF ADDENDUM:    Pt seen and examined, chart reviewed (including anesthesia, drug and allergy history). No interval changes to history and physical examination. Anesthetic plan, risks, benefits, alternatives, and personnel involved discussed with patient. Patient verbalized an understanding and agrees to proceed.       Margie Arizmendi MD  February 6, 2020  2:46 PM          Margie Arizmendi MD   2/6/2020

## 2020-02-06 NOTE — PROGRESS NOTES
Pt arrived to PACU, VSS. Dressing to abdomen intact, scant amount drainage. Ice applied. Pain 2 on FACES scale. Bowel sounds hypoactive. NG to LCWS, immediate output of tan drainage. Will continue to monitor.   Tre Blood RN

## 2020-02-06 NOTE — BRIEF OP NOTE
Brief Postoperative Note  ______________________________________________________________    Patient: Huber Guillermo  YOB: 1940  MRN: [id number]  Date of Procedure: 2/6/2020    Pre-Op Diagnosis: SMALL BOWEL OBSTRUCTION    Post-Op Diagnosis: Same       Procedure(s):  EXPLORATORY LAPAROTOMY, EXTENSIVE LYSIS OF ADHESIONS    Anesthesia: Local    Surgeon(s):  John Eastman MD    Assistant: Valerie Tamez    Estimated Blood Loss (mL): less than 50     Complications: None    Specimens:   * No specimens in log *    Implants:  * No implants in log *      Drains:   NG/OG/NJ/NE Tube Nasogastric 18 fr Right nostril (Active)       Urethral Catheter Non-latex 16 fr (Active)   $ Urethral catheter insertion $ Not inserted for procedure 2/2/2020  9:35 PM   Catheter Indications Acute urinary retention/obstruction 2/6/2020  9:03 AM   Site Assessment Pink 2/6/2020  9:03 AM   Urine Color Laurence 2/6/2020  9:03 AM   Urine Appearance Clear 2/6/2020  9:03 AM   Output (mL) 300 mL 2/6/2020  2:00 PM       [REMOVED] NG/OG/NJ/NE Tube Nasogastric 16 fr Right nostril (Removed)   Surrounding Skin Intact;Dry 2/5/2020  8:15 AM   Securement device Yes 2/5/2020  8:15 AM   Status Clamped 2/5/2020  9:36 AM   Placement Verified by X-Ray (Initial) 2/5/2020  5:02 AM   NG/OG/NJ/NE External Measurement (cm) 70 cm 2/5/2020  5:02 AM   Drainage Appearance Brown 2/5/2020  5:02 AM   Free Water Flush (mL) 30 mL 2/4/2020 10:29 AM   Residual Volume (ml) 600 ml 2/4/2020 10:30 AM   Output (mL) 0 ml 2/6/2020  3:58 AM       Findings: 1) 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

## 2020-02-06 NOTE — PROGRESS NOTES
grossly normal  Skin: rashes- no , ulcers- no, induration- no, tightening - no  Psychiatric:  Judgement and insight- normal           AAO X 3  Additional finding: NG tube is out,   Also has Stout     LABS:     Recent Labs     02/05/20  0543 02/06/20  0536   WBC 6.5 8.8   HGB 12.1* 12.0*   HCT 35.8* 35.5*    219     Recent Labs     02/04/20  2208 02/05/20  0543 02/05/20  1634 02/06/20  0536   * 149* 145 141   K 3.1* 3.1* 3.0* 3.2*    101 97* 95*   CO2 38* 38* 38* 36*   BUN 39* 35* 29* 26*   CREATININE 0.9 0.9 0.9 0.9   GLUCOSE 109* 117* 121* 134*   MG 2.80* 2.80*  --  2.40

## 2020-02-06 NOTE — PROGRESS NOTES
Handoff to [facility] RN. Four eye skin assessment refused by pt due to pain exacerbated by movement.   Monik Bailey RN

## 2020-02-07 PROBLEM — E43 SEVERE MALNUTRITION (HCC): Status: ACTIVE | Noted: 2020-02-07

## 2020-02-07 LAB
ANION GAP SERPL CALCULATED.3IONS-SCNC: 10 MMOL/L (ref 3–16)
BASOPHILS ABSOLUTE: 0 K/UL (ref 0–0.2)
BASOPHILS RELATIVE PERCENT: 0.2 %
BUN BLDV-MCNC: 22 MG/DL (ref 7–20)
CALCIUM SERPL-MCNC: 7.5 MG/DL (ref 8.3–10.6)
CHLORIDE BLD-SCNC: 94 MMOL/L (ref 99–110)
CO2: 32 MMOL/L (ref 21–32)
CREAT SERPL-MCNC: 0.9 MG/DL (ref 0.8–1.3)
EOSINOPHILS ABSOLUTE: 0 K/UL (ref 0–0.6)
EOSINOPHILS RELATIVE PERCENT: 0.1 %
GFR AFRICAN AMERICAN: >60
GFR NON-AFRICAN AMERICAN: >60
GLUCOSE BLD-MCNC: 144 MG/DL (ref 70–99)
HCT VFR BLD CALC: 35.6 % (ref 40.5–52.5)
HEMOGLOBIN: 12.3 G/DL (ref 13.5–17.5)
LYMPHOCYTES ABSOLUTE: 1.5 K/UL (ref 1–5.1)
LYMPHOCYTES RELATIVE PERCENT: 15.1 %
MCH RBC QN AUTO: 33 PG (ref 26–34)
MCHC RBC AUTO-ENTMCNC: 34.6 G/DL (ref 31–36)
MCV RBC AUTO: 95.6 FL (ref 80–100)
MONOCYTES ABSOLUTE: 0.8 K/UL (ref 0–1.3)
MONOCYTES RELATIVE PERCENT: 7.6 %
NEUTROPHILS ABSOLUTE: 7.7 K/UL (ref 1.7–7.7)
NEUTROPHILS RELATIVE PERCENT: 77 %
PDW BLD-RTO: 12.2 % (ref 12.4–15.4)
PLATELET # BLD: 216 K/UL (ref 135–450)
PMV BLD AUTO: 8.3 FL (ref 5–10.5)
POTASSIUM REFLEX MAGNESIUM: 3.7 MMOL/L (ref 3.5–5.1)
RBC # BLD: 3.73 M/UL (ref 4.2–5.9)
SODIUM BLD-SCNC: 136 MMOL/L (ref 136–145)
TRIGL SERPL-MCNC: 71 MG/DL (ref 0–150)
WBC # BLD: 10 K/UL (ref 4–11)

## 2020-02-07 PROCEDURE — 2700000000 HC OXYGEN THERAPY PER DAY

## 2020-02-07 PROCEDURE — 80048 BASIC METABOLIC PNL TOTAL CA: CPT

## 2020-02-07 PROCEDURE — APPSS45 APP SPLIT SHARED TIME 31-45 MINUTES: Performed by: CLINICAL NURSE SPECIALIST

## 2020-02-07 PROCEDURE — 2580000003 HC RX 258: Performed by: SURGERY

## 2020-02-07 PROCEDURE — 6360000002 HC RX W HCPCS: Performed by: SURGERY

## 2020-02-07 PROCEDURE — C9113 INJ PANTOPRAZOLE SODIUM, VIA: HCPCS | Performed by: SURGERY

## 2020-02-07 PROCEDURE — 85025 COMPLETE CBC W/AUTO DIFF WBC: CPT

## 2020-02-07 PROCEDURE — 94761 N-INVAS EAR/PLS OXIMETRY MLT: CPT

## 2020-02-07 PROCEDURE — 84478 ASSAY OF TRIGLYCERIDES: CPT

## 2020-02-07 PROCEDURE — 1200000000 HC SEMI PRIVATE

## 2020-02-07 PROCEDURE — 99024 POSTOP FOLLOW-UP VISIT: CPT | Performed by: SURGERY

## 2020-02-07 PROCEDURE — 36415 COLL VENOUS BLD VENIPUNCTURE: CPT

## 2020-02-07 RX ADMIN — MORPHINE SULFATE 2 MG: 2 INJECTION, SOLUTION INTRAMUSCULAR; INTRAVENOUS at 16:54

## 2020-02-07 RX ADMIN — PANTOPRAZOLE SODIUM 40 MG: 40 INJECTION, POWDER, FOR SOLUTION INTRAVENOUS at 09:34

## 2020-02-07 RX ADMIN — MORPHINE SULFATE 2 MG: 4 INJECTION, SOLUTION INTRAMUSCULAR; INTRAVENOUS at 12:18

## 2020-02-07 RX ADMIN — Medication 10 ML: at 09:35

## 2020-02-07 RX ADMIN — POTASSIUM CHLORIDE: 2 INJECTION, SOLUTION, CONCENTRATE INTRAVENOUS at 02:37

## 2020-02-07 RX ADMIN — MORPHINE SULFATE 2 MG: 2 INJECTION, SOLUTION INTRAMUSCULAR; INTRAVENOUS at 09:34

## 2020-02-07 RX ADMIN — ENOXAPARIN SODIUM 40 MG: 40 INJECTION SUBCUTANEOUS at 09:30

## 2020-02-07 RX ADMIN — Medication 10 ML: at 21:27

## 2020-02-07 RX ADMIN — MORPHINE SULFATE 4 MG: 4 INJECTION, SOLUTION INTRAMUSCULAR; INTRAVENOUS at 02:11

## 2020-02-07 ASSESSMENT — PAIN SCALES - GENERAL
PAINLEVEL_OUTOF10: 6
PAINLEVEL_OUTOF10: 8
PAINLEVEL_OUTOF10: 5
PAINLEVEL_OUTOF10: 6
PAINLEVEL_OUTOF10: 6
PAINLEVEL_OUTOF10: 7

## 2020-02-07 ASSESSMENT — PAIN DESCRIPTION - PAIN TYPE
TYPE: SURGICAL PAIN

## 2020-02-07 ASSESSMENT — PAIN DESCRIPTION - LOCATION
LOCATION: ABDOMEN

## 2020-02-07 NOTE — PROGRESS NOTES
QHS       DVT Prophylaxis: Heparin  Diet: Diet NPO Effective Now  Code Status: Full Code    PT/OT Eval Status: Not indicated at this time    Dispo - hard to say,  pending clinical course      Lázaro Davison MD

## 2020-02-07 NOTE — PROGRESS NOTES
Nutrition Assessment (Parenteral Nutrition)    Type and Reason for Visit: Initial    Nutrition Recommendations:   Recommend check TG, Mg, Phos, CMP now if not done in last 24 hours. Recommend central line placement   Bag 1: Clinimix 5/20 starting at 25 mL/hr  Physician/LIP to monitor closely and correct lytes (Phos,Mg,K+) d/t risk of refeeding syndrome  Bag 2: As long as electrolytes WNL, advance Clinimix 5/20 by 20 ml/hr daily until goal rate of 65 mL/hr is met  Recommend 250 mL 20% lipids 7 times per week  Recommend FSBS, monitor glucose, need for insulin  Pharmacy to adjust MVI and Trace Elements as needed   When PN to be discontinued, cut rate by 50% and let current bag run out. Nutrition Assessment: Severe malnutrition AEB physical assessment and NPO day 5. Pt had no c/o nausea or vomiting at time of visit. Pt states he has not had an appetite for 5 days. Pt was unsure of usual weight, no weight history per EMR. Pt endorses noticing weight loss in his face. Pt may benefit from parenteral nutrition with SBO and NPO status for 5 days. Malnutrition Assessment:  · Malnutrition Status: Meets the criteria for severe malnutrition  · Context: Acute illness or injury  · Findings of the 6 clinical characteristics of malnutrition (Minimum of 2 out of 6 clinical characteristics is required to make the diagnosis of moderate or severe Protein Calorie Malnutrition based on AND/ASPEN Guidelines):  1. Energy Intake-Less than or equal to 50% of estimated energy requirement, Greater than or equal to 5 days    2. Weight Loss-Unable to assess, unable to assess  3. Fat Loss-Severe subcutaneous fat loss, Triceps  4. Muscle Loss-Severe muscle mass loss, Clavicles (pectoralis and deltoids), Temples (temporalis muscle)  5. Fluid Accumulation-No significant fluid accumulation,    6.   Strength-Not measured    Nutrition Risk Level: High    Nutrient Needs:  · Estimated Daily Total Kcal: 9337-2410 kcal  · Estimated Daily

## 2020-02-07 NOTE — PLAN OF CARE
Problem: Nutrition  Goal: Optimal nutrition therapy  Outcome: Ongoing  Note:   Nutrition Problem: Severe malnutrition  Intervention: Food and/or Nutrient Delivery: Start Parenteral Nutrition  Nutritional Goals: Pt will tolerate parenteral nutrition

## 2020-02-07 NOTE — OP NOTE
midline scar from prior surgeries and a portion of the scarring in  the periumbilical region was opened sharply and carried down to the  fascia. The fascia was opened into the abdominal cavity. There were  minimal anterior abdominal wall adhesions on either side which were  cleared away. We now began exploring the small bowel. There was  clearly visible dilated bowel as well as collapsed bowel in the lower  abdomen. I then began freeing up of bowel loops of adhesions and  mobilizing the bowel out of the abdomen. I fairly quickly encountered  what was clearly thus the transition point. It was in fact a loop of bowel that was wedged tightly through a small  hole in the overlying omentum causing a closed loop obstruction. I was  able to easily release and reduce this bowel loop out of hole in the  omentum, which demonstrated compression signs on two points of the  bowel, but the bowel itself was all completely viable and did not  mandate resection. I now proceeded to complete the enterolysis of the entire small bowel  from the ligament of Treitz down to the cecum. A fairly extensive  interloop adhesions throughout the abdomen and these were all lysed  carefully with cautery and blunt dissection. The proximal bowel was  fairly dilated as expected. A nasogastric tube was placed by the  Anesthesia Team and it was confirmed it to be position under my manual  palpation. At this point, after over 45 minutes of adhesiolysis, I was satisfied  with the positioning of the bowels. The bowels were returned to the  abdominal cavity. The abdominal cavity was copiously irrigated with  warm saline and the fluid was aspirated. A sheet of Seprafilm was  placed over the bowels centrally. The midline fascia was now closed  with two running looped 0 PDS sutures. The skin incision was copiously  irrigated and the incision was then closed with skin staples. A sterile  dressing was applied.   The patient was then extubated and taken to the  recovery room in stable condition.         Laura Rowe MD    D: 02/07/2020 8:17:29       T: 02/07/2020 15:48:12     [email or web address]  Job#: [id number]     Doc#: [id number]    CC:

## 2020-02-07 NOTE — PROGRESS NOTES
[facility]    [facility]              [phone]                       Plan :     Sodium is stable  K stable  On dextrose and K drip  Sp surgery  Has significant NG tube output  CO2 improving- on pantoprazole        Assessment :     Hypernatremia  Sodium peaked to 150  Now 136  On D5 with potassium     Acute Kidney Injury  Prerenal  Also was on NSAID's and lisinopril at home  Cr peaked to 3.5- now normal  UA- bland, urine sodium was <20 on admission  Renal imagin/20: R- 9.4 cm, L- 11.1 cm  Has multiple cysts    Also has acute urinary retention- now has Stout    Hypokalemia  Due to GI loss  NG tube suction  Replace IV-since he is n.p.o. Hypertension   BP: ()/(60-70)  Pulse:  []   BP on low side    Alkalosis  Metabolic   Due to GI suction  K correction will help  Also PPI will help    Small bowel obstruction- followed by surgery- surgery done on 20  Currently on supportive treatment    Omental internal hernia w/closed loop SOB, reduction done, extensive interloop  Adhesions incided on 20      [facility]ph[facility] would like to thank Meghna Lee MD   for opportunity to serve this patient      Please call with questions at-   24 Hrs Answering service [phone] or  7 am- 5 pm via Perfect serve or cell phone  Dr.Sudhir Lupe Kaplan          CC/reason for consult :     hypernatremia     HPI :     From consult note-     Ashwin Morrison is a 79 y.o. male presented to   the hospital on 2020 with pain abdomen,nausea  And vomiting. He was constipated, and has had no  BM for about a week. Also has had abdominal distention  Before coming to the hospital.Now has NG tube, getting  Better. His distended abdomen getting better. Meanwhile we are consulted for hyponatremia and   Related issues.      Interval History:     Had surgery yesterday  Has NG tube with high output    ROS:     Seen with- no family  SOB-   none  Edema-   none  Nausea/vomiting/diarrhea-   none  Poor hepatosplenomegaly-  no  Musculoskeletal:  clubbing no,cyanosis- no , digital ischemia- no                           muscle strength- grossly normal , tone - grossly normal  Skin: rashes- no , ulcers- no, induration- no, tightening - no  Psychiatric:  Judgement and insight- normal           AAO X 3  Additional finding: NG tube is out,   Also has Stout     LABS:     Recent Labs     02/05/20  0543 02/06/20  0536 02/07/20  0540   WBC 6.5 8.8 10.0   HGB 12.1* 12.0* 12.3*   HCT 35.8* 35.5* 35.6*    219 216     Recent Labs     02/04/20  2208 02/05/20  0543 02/05/20  1634 02/06/20  0536 02/07/20  0539   * 149* 145 141 136   K 3.1* 3.1* 3.0* 3.2* 3.7    101 97* 95* 94*   CO2 38* 38* 38* 36* 32   BUN 39* 35* 29* 26* 22*   CREATININE 0.9 0.9 0.9 0.9 0.9   GLUCOSE 109* 117* 121* 134* 144*   MG 2.80* 2.80*  --  2.40  --

## 2020-02-08 LAB
ANION GAP SERPL CALCULATED.3IONS-SCNC: 8 MMOL/L (ref 3–16)
BASOPHILS ABSOLUTE: 0 K/UL (ref 0–0.2)
BASOPHILS RELATIVE PERCENT: 0.1 %
BUN BLDV-MCNC: 18 MG/DL (ref 7–20)
CALCIUM SERPL-MCNC: 7.7 MG/DL (ref 8.3–10.6)
CHLORIDE BLD-SCNC: 92 MMOL/L (ref 99–110)
CO2: 32 MMOL/L (ref 21–32)
CREAT SERPL-MCNC: 0.7 MG/DL (ref 0.8–1.3)
EOSINOPHILS ABSOLUTE: 0.1 K/UL (ref 0–0.6)
EOSINOPHILS RELATIVE PERCENT: 1.6 %
GFR AFRICAN AMERICAN: >60
GFR NON-AFRICAN AMERICAN: >60
GLUCOSE BLD-MCNC: 122 MG/DL (ref 70–99)
HCT VFR BLD CALC: 32.4 % (ref 40.5–52.5)
HEMOGLOBIN: 10.9 G/DL (ref 13.5–17.5)
LYMPHOCYTES ABSOLUTE: 1.6 K/UL (ref 1–5.1)
LYMPHOCYTES RELATIVE PERCENT: 18.3 %
MAGNESIUM: 2.4 MG/DL (ref 1.8–2.4)
MCH RBC QN AUTO: 32 PG (ref 26–34)
MCHC RBC AUTO-ENTMCNC: 33.8 G/DL (ref 31–36)
MCV RBC AUTO: 94.9 FL (ref 80–100)
MONOCYTES ABSOLUTE: 1.2 K/UL (ref 0–1.3)
MONOCYTES RELATIVE PERCENT: 13.2 %
NEUTROPHILS ABSOLUTE: 5.9 K/UL (ref 1.7–7.7)
NEUTROPHILS RELATIVE PERCENT: 66.8 %
PDW BLD-RTO: 12.2 % (ref 12.4–15.4)
PHOSPHORUS: 1.5 MG/DL (ref 2.5–4.9)
PLATELET # BLD: 225 K/UL (ref 135–450)
PMV BLD AUTO: 8.3 FL (ref 5–10.5)
POTASSIUM REFLEX MAGNESIUM: 3.4 MMOL/L (ref 3.5–5.1)
RBC # BLD: 3.41 M/UL (ref 4.2–5.9)
SODIUM BLD-SCNC: 132 MMOL/L (ref 136–145)
WBC # BLD: 8.8 K/UL (ref 4–11)

## 2020-02-08 PROCEDURE — 84100 ASSAY OF PHOSPHORUS: CPT

## 2020-02-08 PROCEDURE — 6360000002 HC RX W HCPCS: Performed by: SURGERY

## 2020-02-08 PROCEDURE — 2580000003 HC RX 258: Performed by: INTERNAL MEDICINE

## 2020-02-08 PROCEDURE — 94761 N-INVAS EAR/PLS OXIMETRY MLT: CPT

## 2020-02-08 PROCEDURE — 1200000000 HC SEMI PRIVATE

## 2020-02-08 PROCEDURE — 99024 POSTOP FOLLOW-UP VISIT: CPT | Performed by: SURGERY

## 2020-02-08 PROCEDURE — 36415 COLL VENOUS BLD VENIPUNCTURE: CPT

## 2020-02-08 PROCEDURE — 6360000002 HC RX W HCPCS: Performed by: INTERNAL MEDICINE

## 2020-02-08 PROCEDURE — 80048 BASIC METABOLIC PNL TOTAL CA: CPT

## 2020-02-08 PROCEDURE — 85025 COMPLETE CBC W/AUTO DIFF WBC: CPT

## 2020-02-08 PROCEDURE — 2580000003 HC RX 258: Performed by: SURGERY

## 2020-02-08 PROCEDURE — 2500000003 HC RX 250 WO HCPCS: Performed by: INTERNAL MEDICINE

## 2020-02-08 PROCEDURE — C9113 INJ PANTOPRAZOLE SODIUM, VIA: HCPCS | Performed by: SURGERY

## 2020-02-08 PROCEDURE — 2700000000 HC OXYGEN THERAPY PER DAY

## 2020-02-08 PROCEDURE — 83735 ASSAY OF MAGNESIUM: CPT

## 2020-02-08 RX ORDER — 0.9 % SODIUM CHLORIDE 0.9 %
500 INTRAVENOUS SOLUTION INTRAVENOUS ONCE
Status: COMPLETED | OUTPATIENT
Start: 2020-02-08 | End: 2020-02-08

## 2020-02-08 RX ORDER — SODIUM CHLORIDE AND POTASSIUM CHLORIDE .9; .15 G/100ML; G/100ML
SOLUTION INTRAVENOUS CONTINUOUS
Status: DISCONTINUED | OUTPATIENT
Start: 2020-02-08 | End: 2020-02-10 | Stop reason: SDUPTHER

## 2020-02-08 RX ORDER — SODIUM CHLORIDE 9 MG/ML
INJECTION, SOLUTION INTRAVENOUS
Status: DISPENSED
Start: 2020-02-08 | End: 2020-02-09

## 2020-02-08 RX ADMIN — MORPHINE SULFATE 4 MG: 4 INJECTION, SOLUTION INTRAMUSCULAR; INTRAVENOUS at 21:37

## 2020-02-08 RX ADMIN — MORPHINE SULFATE 4 MG: 4 INJECTION, SOLUTION INTRAMUSCULAR; INTRAVENOUS at 15:42

## 2020-02-08 RX ADMIN — ONDANSETRON 4 MG: 2 INJECTION INTRAMUSCULAR; INTRAVENOUS at 21:35

## 2020-02-08 RX ADMIN — MORPHINE SULFATE 4 MG: 4 INJECTION, SOLUTION INTRAMUSCULAR; INTRAVENOUS at 00:43

## 2020-02-08 RX ADMIN — MORPHINE SULFATE 4 MG: 4 INJECTION, SOLUTION INTRAMUSCULAR; INTRAVENOUS at 13:04

## 2020-02-08 RX ADMIN — MORPHINE SULFATE 4 MG: 4 INJECTION, SOLUTION INTRAMUSCULAR; INTRAVENOUS at 08:13

## 2020-02-08 RX ADMIN — PHENOL 1 SPRAY: 1.5 LIQUID ORAL at 21:39

## 2020-02-08 RX ADMIN — Medication 10 ML: at 08:16

## 2020-02-08 RX ADMIN — ENOXAPARIN SODIUM 40 MG: 40 INJECTION SUBCUTANEOUS at 08:13

## 2020-02-08 RX ADMIN — POTASSIUM CHLORIDE AND SODIUM CHLORIDE: 900; 150 INJECTION, SOLUTION INTRAVENOUS at 16:39

## 2020-02-08 RX ADMIN — MORPHINE SULFATE 4 MG: 4 INJECTION, SOLUTION INTRAMUSCULAR; INTRAVENOUS at 10:52

## 2020-02-08 RX ADMIN — SODIUM PHOSPHATE, MONOBASIC, MONOHYDRATE 22.83 MMOL: 276; 142 INJECTION, SOLUTION INTRAVENOUS at 08:14

## 2020-02-08 RX ADMIN — SODIUM CHLORIDE 500 ML: 9 INJECTION, SOLUTION INTRAVENOUS at 14:27

## 2020-02-08 RX ADMIN — ONDANSETRON 4 MG: 2 INJECTION INTRAMUSCULAR; INTRAVENOUS at 10:57

## 2020-02-08 RX ADMIN — PANTOPRAZOLE SODIUM 40 MG: 40 INJECTION, POWDER, FOR SOLUTION INTRAVENOUS at 08:14

## 2020-02-08 ASSESSMENT — PAIN SCALES - GENERAL
PAINLEVEL_OUTOF10: 4
PAINLEVEL_OUTOF10: 7
PAINLEVEL_OUTOF10: 8
PAINLEVEL_OUTOF10: 7
PAINLEVEL_OUTOF10: 8

## 2020-02-08 NOTE — PROGRESS NOTES
Pt BP checked and found to be 87/52. Rechecked and it was 80/49. When pt asked if he was dizzy he stated he was \"on the edge of being dizzy but not really\". Pt up in chair at this time.  Physician made aware

## 2020-02-08 NOTE — PLAN OF CARE
Awake in bed today. Did report abdominal surgical pain with reported pain relief with IV morphine. Did sit on side of bed with stand by support but reported feeling light headed when attempted to pivot to chair so helped pt back to bed. Reported feeling better at rest. Daughter at bedside. Bed alarm on and call light in reach.

## 2020-02-08 NOTE — PROGRESS NOTES
[facility]    [facility]. [facility]              [phone]                       Plan :     Sodium dropping further to 132 and K low as well at 3.4. On dextrose and K drip changed to NS with K.   Sp surgery  Has significant NG tube output  CO2 improving- on pantoprazole        Assessment :     Hypernatremia  Sodium peaked to 150  Now 136  On D5 with potassium     Acute Kidney Injury  Prerenal  Also was on NSAID's and lisinopril at home  Cr peaked to 3.5- now normal  UA- bland, urine sodium was <20 on admission  Renal imagin/20: R- 9.4 cm, L- 11.1 cm  Has multiple cysts    Also has acute urinary retention- now has Stout    Hypokalemia  Due to GI loss  NG tube suction  Replace IV-since he is n.p.o. Hypertension   BP: ()/(52-73)  Pulse:  []   BP on low side    Alkalosis  Metabolic   Due to GI suction  K correction will help  Also PPI will help    Small bowel obstruction- followed by surgery- surgery done on 20  Currently on supportive treatment    Omental internal hernia w/closed loop SOB, reduction done, extensive interloop  Adhesions incided on 20      [facility]phrology would like to thank Bindu Alarcon MD   for opportunity to serve this patient      Please call with questions at-   24 Hrs Answering service [phone] or  7 am- 5 pm via Perfect serve or cell phone  Zulma Lomeli          CC/reason for consult :     hypernatremia     HPI :     From consult note-     Davey Stinson is a 79 y.o. male presented to   the hospital on 2020 with pain abdomen,nausea  And vomiting. He was constipated, and has had no  BM for about a week. Also has had abdominal distention  Before coming to the hospital.Now has NG tube, getting  Better. His distended abdomen getting better. Meanwhile we are consulted for hyponatremia and   Related issues.      Interval History:     Had surgery yesterday  Has NG tube with high output    ROS:     Seen with- no family  SOB-

## 2020-02-08 NOTE — PLAN OF CARE
Problem: Pain:  Goal: Pain level will decrease  Description  Pain level will decrease  Outcome: Ongoing  Goal: Control of acute pain  Description  Control of acute pain  Outcome: Ongoing     Problem: Falls - Risk of:  Goal: Will remain free from falls  Description  Will remain free from falls  Outcome: Ongoing  Goal: Absence of physical injury  Description  Absence of physical injury  Outcome: Ongoing     Problem: Nutrition  Goal: Optimal nutrition therapy  2/7/2020 1538 by Lloyd Reyes, MS, RD, LD  Outcome: Ongoing  Note:   Nutrition Problem: Severe malnutrition  Intervention: Food and/or Nutrient Delivery: Start Parenteral Nutrition  Nutritional Goals: Pt will tolerate parenteral nutrition with        Problem: SAFETY  Goal: Free from accidental physical injury  Outcome: Ongoing     Problem: DAILY CARE  Goal: Daily care needs are met  2/8/2020 0151 by Ana Hendrix RN  Outcome: Ongoing  2/7/2020 2012 by Rachell Rodarte RN  Outcome: Ongoing     Problem: SKIN INTEGRITY  Goal: Skin integrity is maintained or improved  Outcome: Ongoing

## 2020-02-08 NOTE — PROGRESS NOTES
Pt assessment completed and charted. Pt a/o. Pt VSS. Pt has c/o pain in abd/back. Medicated per physician order (see MAR). Pt c/o of weakness/fatigue. Pt states that he is passing flatus. Call light within reach. Bed alarm on.

## 2020-02-08 NOTE — PROGRESS NOTES
Hospitalist Progress Note      PCP: Vin Hernandez    Date of Admission: 2/2/2020    Chief Complaint: Abdominal pain, constipation and vomiting    Hospital Course: H & P  reviewed. Patient admitted with SBO and GEOVANNA. Subjective:     Patient denies any overt abdominal pain, nausea or vomiting. Reports he is missing his family and hopes to go home soon. Medications:  Reviewed    Infusion Medications    IV infusion builder 80 mL/hr at 02/07/20 0237     Scheduled Medications    pantoprazole  40 mg Intravenous Daily    enoxaparin  40 mg Subcutaneous Daily    sodium chloride flush  10 mL Intravenous 2 times per day     PRN Meds: sodium phosphate IVPB **OR** sodium phosphate IVPB, morphine **OR** morphine, potassium chloride **OR** potassium alternative oral replacement **OR** potassium chloride, LORazepam, phenol, ondansetron, sodium chloride flush      Intake/Output Summary (Last 24 hours) at 2/8/2020 0934  Last data filed at 2/8/2020 0411  Gross per 24 hour   Intake 640 ml   Output 1550 ml   Net -910 ml       Physical Exam Performed:    BP (!) 94/56   Pulse 111   Temp 98.5 °F (36.9 °C) (Oral)   Resp 18   Ht 6' 1\" (1.854 m)   Wt 157 lb 3 oz (71.3 kg)   SpO2 95%   BMI 20.74 kg/m²     General appearance: No apparent distress, appears stated age and cooperative. HEENT: Pupils equal, round,  Conjunctivae/corneas clear. Neck: Supple, with full range of motion. No jugular venous distention. Trachea midline. Respiratory:  Normal respiratory effort. Clear to auscultation, bilaterally without Rales/Wheezes/Rhonchi. Cardiovascular: Regular rate and rhythm with normal S1/S2 without murmurs, rubs or gallops. Abdomen: Soft, midline incision site with surgical dressing in place not removed. Hypoactive BS. Musculoskeletal: No clubbing, cyanosis or edema bilaterally. Skin: Warm and dry  Neurologic: Alert, speech clear with no overt facial droop.     Psychiatric: Alert, not anxious appearing  Capillary Refill: Brisk,< 3 seconds   Peripheral Pulses: +2 palpable, equal bilaterally       Labs:   Recent Labs     02/06/20  0536 02/07/20  0540 02/08/20  0603   WBC 8.8 10.0 8.8   HGB 12.0* 12.3* 10.9*   HCT 35.5* 35.6* 32.4*    216 225     Recent Labs     02/06/20  0536 02/07/20  0539 02/08/20  0603    136 132*   K 3.2* 3.7 3.4*   CL 95* 94* 92*   CO2 36* 32 32   BUN 26* 22* 18   CREATININE 0.9 0.9 0.7*   CALCIUM 8.2* 7.5* 7.7*   PHOS  --   --  1.5*     No results for input(s): AST, ALT, BILIDIR, BILITOT, ALKPHOS in the last 72 hours. No results for input(s): INR in the last 72 hours. No results for input(s): Eugune Ran in the last 72 hours. Urinalysis:      Lab Results   Component Value Date    NITRU Negative 02/02/2020    BLOODU Negative 02/02/2020    SPECGRAV >=1.030 02/02/2020    GLUCOSEU Negative 02/02/2020       Radiology:  FL SMALL BOWEL FOLLOW THROUGH ONLY   Final Result   Addendum 1 of 1   ADDENDUM:   An additional image was obtained at 20.5 hours which continues to    demonstrate   contrast throughout dilated small bowel loops, without contrast seen    within   the colon. Findings are compatible with history of small bowel    obstruction. Final      XR ABDOMEN (2 VIEWS)   Final Result   Nasogastric tube side hole overlies the distal esophagus with tip at the GE   junction. Several cm advancement recommended for optimal positioning. Gas and stool are seen throughout the colon. The previously seen dilated   fluid-filled loops of small bowel on CT are not well visualized on   radiographs. No free air. US RENAL COMPLETE   Final Result   1. No evidence of hydronephrosis. 2. Findings suggestive of presence of left-sided renal cysts, largest of   which measures up to approximately 4 cm in maximum transverse diameter as   described above. XR CHEST PORTABLE   Final Result   Enteric tube projects near the level of lower esophagus.   Tube

## 2020-02-08 NOTE — PROGRESS NOTES
Soap suds enema performed. 1500ml inserted. 900ml out (leakage on bed as well). Pt tolerated well. NG clamp at 12:45pm. Pt up to chair.

## 2020-02-08 NOTE — PROGRESS NOTES
[facility] GENERAL SURGERY    Surgery Progress Note           POD # 2    PATIENT NAME: Abundio Dia     TODAY'S DATE: 2/8/2020    INTERVAL HISTORY:    Pt  Doing well overall, adequate pain control, thinks he might have passed some gas, no BM, no nausea. OBJECTIVE:   VITALS:  BP (!) 94/56   Pulse 111   Temp 98.5 °F (36.9 °C) (Oral)   Resp 18   Ht 6' 1\" (1.854 m)   Wt 157 lb 3 oz (71.3 kg)   SpO2 95%   BMI 20.74 kg/m²     INTAKE/OUTPUT:    I/O last 3 completed shifts: In: 640 [I.V.:640]  Out: 1550 [Urine:750; Emesis/NG output:800]  No intake/output data recorded. CONSTITUTIONAL:  awake and alert  LUNGS:  clear to auscultation  ABDOMEN:   hypoactive bowel sounds, soft, non-distended, tenderness noted at incision   INCISION: clean, dry    Data:  CBC:   Recent Labs     02/06/20  0536 02/07/20  0540 02/08/20  0603   WBC 8.8 10.0 8.8   HGB 12.0* 12.3* 10.9*   HCT 35.5* 35.6* 32.4*    216 225     BMP:    Recent Labs     02/06/20  0536 02/07/20  0539 02/08/20  0603    136 132*   K 3.2* 3.7 3.4*   CL 95* 94* 92*   CO2 36* 32 32   BUN 26* 22* 18   CREATININE 0.9 0.9 0.7*   GLUCOSE 134* 144* 122*     Hepatic: No results for input(s): AST, ALT, ALB, BILITOT, ALKPHOS in the last 72 hours. Mag:      Recent Labs     02/06/20  0536 02/08/20  0603   MG 2.40 2.40      Phos:     Recent Labs     02/08/20  0603   PHOS 1.5*      INR: No results for input(s): INR in the last 72 hours.       Radiology Review:       ASSESSMENT AND PLAN:  79 y.o. male status post ex lap, extensive MARIANN for SBO, w/ concurrent severe chronic constipation   - start clamping NGT 2/4 hours   - will start soap suds enemas 1-2x/day    - up to chair   - if unable to progress to PO diet in next few days, may need TPN           Electronically signed by Katheryn Livingston MD

## 2020-02-09 LAB
ANION GAP SERPL CALCULATED.3IONS-SCNC: 8 MMOL/L (ref 3–16)
BASOPHILS ABSOLUTE: 0 K/UL (ref 0–0.2)
BASOPHILS RELATIVE PERCENT: 0.1 %
BUN BLDV-MCNC: 14 MG/DL (ref 7–20)
CALCIUM IONIZED: 1.04 MMOL/L (ref 1.12–1.32)
CALCIUM SERPL-MCNC: 7.7 MG/DL (ref 8.3–10.6)
CHLORIDE BLD-SCNC: 96 MMOL/L (ref 99–110)
CO2: 29 MMOL/L (ref 21–32)
CREAT SERPL-MCNC: 0.6 MG/DL (ref 0.8–1.3)
EOSINOPHILS ABSOLUTE: 0.2 K/UL (ref 0–0.6)
EOSINOPHILS RELATIVE PERCENT: 1.8 %
GFR AFRICAN AMERICAN: >60
GFR NON-AFRICAN AMERICAN: >60
GLUCOSE BLD-MCNC: 86 MG/DL (ref 70–99)
HCT VFR BLD CALC: 28.9 % (ref 40.5–52.5)
HEMOGLOBIN: 10 G/DL (ref 13.5–17.5)
LYMPHOCYTES ABSOLUTE: 1.5 K/UL (ref 1–5.1)
LYMPHOCYTES RELATIVE PERCENT: 17.9 %
MAGNESIUM: 2.2 MG/DL (ref 1.8–2.4)
MCH RBC QN AUTO: 32.9 PG (ref 26–34)
MCHC RBC AUTO-ENTMCNC: 34.5 G/DL (ref 31–36)
MCV RBC AUTO: 95.4 FL (ref 80–100)
MONOCYTES ABSOLUTE: 1.1 K/UL (ref 0–1.3)
MONOCYTES RELATIVE PERCENT: 12.8 %
NEUTROPHILS ABSOLUTE: 5.6 K/UL (ref 1.7–7.7)
NEUTROPHILS RELATIVE PERCENT: 67.4 %
PDW BLD-RTO: 12.1 % (ref 12.4–15.4)
PH VENOUS: 7.41 (ref 7.35–7.45)
PLATELET # BLD: 233 K/UL (ref 135–450)
PMV BLD AUTO: 8.1 FL (ref 5–10.5)
POTASSIUM REFLEX MAGNESIUM: 3.5 MMOL/L (ref 3.5–5.1)
RBC # BLD: 3.03 M/UL (ref 4.2–5.9)
SODIUM BLD-SCNC: 133 MMOL/L (ref 136–145)
WBC # BLD: 8.3 K/UL (ref 4–11)

## 2020-02-09 PROCEDURE — 97162 PT EVAL MOD COMPLEX 30 MIN: CPT

## 2020-02-09 PROCEDURE — 83735 ASSAY OF MAGNESIUM: CPT

## 2020-02-09 PROCEDURE — 2500000003 HC RX 250 WO HCPCS: Performed by: INTERNAL MEDICINE

## 2020-02-09 PROCEDURE — 82330 ASSAY OF CALCIUM: CPT

## 2020-02-09 PROCEDURE — 97166 OT EVAL MOD COMPLEX 45 MIN: CPT

## 2020-02-09 PROCEDURE — 97530 THERAPEUTIC ACTIVITIES: CPT

## 2020-02-09 PROCEDURE — 6370000000 HC RX 637 (ALT 250 FOR IP): Performed by: SURGERY

## 2020-02-09 PROCEDURE — 6360000002 HC RX W HCPCS: Performed by: SURGERY

## 2020-02-09 PROCEDURE — 99024 POSTOP FOLLOW-UP VISIT: CPT | Performed by: SURGERY

## 2020-02-09 PROCEDURE — 36415 COLL VENOUS BLD VENIPUNCTURE: CPT

## 2020-02-09 PROCEDURE — 1200000000 HC SEMI PRIVATE

## 2020-02-09 PROCEDURE — 80048 BASIC METABOLIC PNL TOTAL CA: CPT

## 2020-02-09 PROCEDURE — 82306 VITAMIN D 25 HYDROXY: CPT

## 2020-02-09 PROCEDURE — 2580000003 HC RX 258: Performed by: INTERNAL MEDICINE

## 2020-02-09 PROCEDURE — 6360000002 HC RX W HCPCS: Performed by: INTERNAL MEDICINE

## 2020-02-09 PROCEDURE — 2580000003 HC RX 258: Performed by: SURGERY

## 2020-02-09 PROCEDURE — 94761 N-INVAS EAR/PLS OXIMETRY MLT: CPT

## 2020-02-09 PROCEDURE — C9113 INJ PANTOPRAZOLE SODIUM, VIA: HCPCS | Performed by: SURGERY

## 2020-02-09 PROCEDURE — 85025 COMPLETE CBC W/AUTO DIFF WBC: CPT

## 2020-02-09 PROCEDURE — 2700000000 HC OXYGEN THERAPY PER DAY

## 2020-02-09 RX ORDER — BISACODYL 10 MG
10 SUPPOSITORY, RECTAL RECTAL 2 TIMES DAILY
Status: COMPLETED | OUTPATIENT
Start: 2020-02-09 | End: 2020-02-09

## 2020-02-09 RX ORDER — 0.9 % SODIUM CHLORIDE 0.9 %
500 INTRAVENOUS SOLUTION INTRAVENOUS ONCE
Status: COMPLETED | OUTPATIENT
Start: 2020-02-09 | End: 2020-02-09

## 2020-02-09 RX ADMIN — PANTOPRAZOLE SODIUM 40 MG: 40 INJECTION, POWDER, FOR SOLUTION INTRAVENOUS at 08:22

## 2020-02-09 RX ADMIN — POTASSIUM CHLORIDE AND SODIUM CHLORIDE: 900; 150 INJECTION, SOLUTION INTRAVENOUS at 02:40

## 2020-02-09 RX ADMIN — BISACODYL 10 MG: 10 SUPPOSITORY RECTAL at 21:29

## 2020-02-09 RX ADMIN — POTASSIUM CHLORIDE AND SODIUM CHLORIDE: 900; 150 INJECTION, SOLUTION INTRAVENOUS at 19:39

## 2020-02-09 RX ADMIN — ONDANSETRON 4 MG: 2 INJECTION INTRAMUSCULAR; INTRAVENOUS at 12:10

## 2020-02-09 RX ADMIN — BISACODYL 10 MG: 10 SUPPOSITORY RECTAL at 11:08

## 2020-02-09 RX ADMIN — MORPHINE SULFATE 4 MG: 4 INJECTION, SOLUTION INTRAMUSCULAR; INTRAVENOUS at 04:34

## 2020-02-09 RX ADMIN — MORPHINE SULFATE 4 MG: 4 INJECTION, SOLUTION INTRAMUSCULAR; INTRAVENOUS at 19:55

## 2020-02-09 RX ADMIN — SODIUM PHOSPHATE, MONOBASIC, MONOHYDRATE 22.83 MMOL: 276; 142 INJECTION, SOLUTION INTRAVENOUS at 11:07

## 2020-02-09 RX ADMIN — ONDANSETRON 4 MG: 2 INJECTION INTRAMUSCULAR; INTRAVENOUS at 07:02

## 2020-02-09 RX ADMIN — POTASSIUM CHLORIDE AND SODIUM CHLORIDE: 900; 150 INJECTION, SOLUTION INTRAVENOUS at 11:12

## 2020-02-09 RX ADMIN — SODIUM CHLORIDE 500 ML: 9 INJECTION, SOLUTION INTRAVENOUS at 16:58

## 2020-02-09 RX ADMIN — MORPHINE SULFATE 4 MG: 4 INJECTION, SOLUTION INTRAMUSCULAR; INTRAVENOUS at 16:57

## 2020-02-09 RX ADMIN — MORPHINE SULFATE 4 MG: 4 INJECTION, SOLUTION INTRAMUSCULAR; INTRAVENOUS at 14:32

## 2020-02-09 RX ADMIN — Medication 10 ML: at 08:22

## 2020-02-09 RX ADMIN — MORPHINE SULFATE 4 MG: 4 INJECTION, SOLUTION INTRAMUSCULAR; INTRAVENOUS at 07:02

## 2020-02-09 RX ADMIN — MORPHINE SULFATE 4 MG: 4 INJECTION, SOLUTION INTRAMUSCULAR; INTRAVENOUS at 12:10

## 2020-02-09 RX ADMIN — MORPHINE SULFATE 4 MG: 4 INJECTION, SOLUTION INTRAMUSCULAR; INTRAVENOUS at 09:23

## 2020-02-09 ASSESSMENT — PAIN DESCRIPTION - PAIN TYPE
TYPE: SURGICAL PAIN;ACUTE PAIN
TYPE: SURGICAL PAIN;ACUTE PAIN

## 2020-02-09 ASSESSMENT — PAIN SCALES - GENERAL
PAINLEVEL_OUTOF10: 6
PAINLEVEL_OUTOF10: 8
PAINLEVEL_OUTOF10: 9
PAINLEVEL_OUTOF10: 6
PAINLEVEL_OUTOF10: 8
PAINLEVEL_OUTOF10: 6
PAINLEVEL_OUTOF10: 6
PAINLEVEL_OUTOF10: 8
PAINLEVEL_OUTOF10: 8

## 2020-02-09 ASSESSMENT — PAIN DESCRIPTION - DESCRIPTORS: DESCRIPTORS: ACHING;CONSTANT;SORE

## 2020-02-09 ASSESSMENT — PAIN DESCRIPTION - LOCATION
LOCATION: ABDOMEN
LOCATION: ABDOMEN

## 2020-02-09 ASSESSMENT — PAIN DESCRIPTION - ORIENTATION: ORIENTATION: LOWER

## 2020-02-09 NOTE — PROGRESS NOTES
Hospitalist Progress Note      PCP: Bobby Peña    Date of Admission: 2/2/2020    Chief Complaint: Abdominal pain, constipation and vomiting    Hospital Course: H & P  reviewed. Patient admitted with SBO and GEOVANNA. Subjective:     Pt on intermittent clamping of NG tube which he has been tolerating with minimal abd pain. Denied any N/V. Pt worked with PT today. Medications:  Reviewed    Infusion Medications    0.9% NaCl with KCl 20 mEq 100 mL/hr at 02/09/20 0240     Scheduled Medications    bisacodyl  10 mg Rectal BID    pantoprazole  40 mg Intravenous Daily    enoxaparin  40 mg Subcutaneous Daily    sodium chloride flush  10 mL Intravenous 2 times per day     PRN Meds: sodium phosphate IVPB **OR** sodium phosphate IVPB, morphine **OR** morphine, potassium chloride **OR** potassium alternative oral replacement **OR** potassium chloride, LORazepam, phenol, ondansetron, sodium chloride flush      Intake/Output Summary (Last 24 hours) at 2/9/2020 1051  Last data filed at 2/9/2020 9819  Gross per 24 hour   Intake 2738 ml   Output 2175 ml   Net 563 ml       Physical Exam Performed:    BP (!) 98/59   Pulse 79   Temp 98.4 °F (36.9 °C) (Oral)   Resp 18   Ht 6' 1\" (1.854 m)   Wt 157 lb 3 oz (71.3 kg)   SpO2 93%   BMI 20.74 kg/m²     General appearance: No apparent distress, appears stated age and cooperative. HEENT: Pupils equal, round,  Conjunctivae/corneas clear. Neck: Supple, with full range of motion. No jugular venous distention. Trachea midline. Respiratory:  Normal respiratory effort. Clear to auscultation, bilaterally without Rales/Wheezes/Rhonchi. Cardiovascular: Regular rate and rhythm with normal S1/S2 without murmurs, rubs or gallops. Abdomen: Soft, non tender, non distended, midline incision site with staples in place. Hypoactive BS. Musculoskeletal: No clubbing, cyanosis or edema bilaterally. Skin: Warm and dry  Neurologic: Alert, speech clear with no overt facial droop. Psychiatric: Alert, not anxious appearing  Capillary Refill: Brisk,< 3 seconds   Peripheral Pulses: +2 palpable, equal bilaterally       Labs:   Recent Labs     02/07/20  0540 02/08/20  0603 02/09/20  0645   WBC 10.0 8.8 8.3   HGB 12.3* 10.9* 10.0*   HCT 35.6* 32.4* 28.9*    225 233     Recent Labs     02/07/20  0539 02/08/20  0603 02/09/20  0645    132* 133*   K 3.7 3.4* 3.5   CL 94* 92* 96*   CO2 32 32 29   BUN 22* 18 14   CREATININE 0.9 0.7* 0.6*   CALCIUM 7.5* 7.7* 7.7*   PHOS  --  1.5*  --      No results for input(s): AST, ALT, BILIDIR, BILITOT, ALKPHOS in the last 72 hours. No results for input(s): INR in the last 72 hours. No results for input(s): Paulo Cain in the last 72 hours. Urinalysis:      Lab Results   Component Value Date    NITRU Negative 02/02/2020    BLOODU Negative 02/02/2020    SPECGRAV >=1.030 02/02/2020    GLUCOSEU Negative 02/02/2020       Radiology:  FL SMALL BOWEL FOLLOW THROUGH ONLY   Final Result   Addendum 1 of 1   ADDENDUM:   An additional image was obtained at 20.5 hours which continues to    demonstrate   contrast throughout dilated small bowel loops, without contrast seen    within   the colon. Findings are compatible with history of small bowel    obstruction. Final      XR ABDOMEN (2 VIEWS)   Final Result   Nasogastric tube side hole overlies the distal esophagus with tip at the GE   junction. Several cm advancement recommended for optimal positioning. Gas and stool are seen throughout the colon. The previously seen dilated   fluid-filled loops of small bowel on CT are not well visualized on   radiographs. No free air. US RENAL COMPLETE   Final Result   1. No evidence of hydronephrosis. 2. Findings suggestive of presence of left-sided renal cysts, largest of   which measures up to approximately 4 cm in maximum transverse diameter as   described above.          XR CHEST PORTABLE   Final Result   Enteric tube projects near the level of lower esophagus. Tube should be   advanced and reimaging should be considered to document appropriate   positioning. Left lower lung airspace disease suspicious of pneumonia versus atelectasis. CT ABDOMEN PELVIS WO CONTRAST Additional Contrast? None   Final Result   1. Small-bowel obstruction with a transition point likely in the pelvis. No   evidence of free air to suggest perforation. 2. Distended esophagus which is fluid filled likely related to reflux. 3. Stable position of Benito catheter in the urinary bladder. XR ABDOMEN (KUB) (SINGLE AP VIEW)   Final Result   1. Large amount of formed stool throughout the colon corresponding to given   history of constipation. 2. No bowel obstruction identified. Assessment/Plan:    Active Hospital Problems    Diagnosis    SBO (small bowel obstruction) ([facility] [address] [K56.609]     Priority: High    GEOVANNA (acute kidney injury) ([facility] [address] [N17.9]     Priority: Medium    Severe malnutrition (HCC) [E43]    Hypertension [I10]    Hyperlipidemia [E78.5]     SBO :  Gen Surg assisting. Pt failed conservative management. S/p exp lap and MARIANN on 2/6. Continue mgt per surgery-  NG clamping, ducolax suppositories, NPO, IVF, pain control PRN. GEOVANNA : likely due to vol depletion, compounded by use of lisinopril, NSAIDs. Held lisinopril, avoid NSAIDs. Improving with IVF. Renal US with no hydronephrosis , left renal cysts. Continue IVF and avoid nephrotoxins. Creatinine normalized. Resolved      Acute urinary retention: likely related to SBO. Improved s/p benito placement. Voiding trial when more ambulatory     Hypernatremia: Likely due to volume depletion, n.p.o. status. Improved with IVF  Mgt per nephro. Na normalized. Hypokalemia: Likely due to GI losses. Mag normal.  Monitoring and replacing as needed      Hyperlipidemia: resume statin when no longer NPO. Hypotension: improved with IVF bolus.  BP borderline low but

## 2020-02-09 NOTE — PLAN OF CARE
Problem: Pain:  Goal: Pain level will decrease  Description  Pain level will decrease  Outcome: Ongoing  Note:   Pt c/o abdominal pain 7/10, prn IV pain medication given per MAR. Pt repositioned for comfort. Will continue to monitor.

## 2020-02-09 NOTE — PROGRESS NOTES
[facility]    [facility]              [phone]                       Plan :     Sodium now 133 and K low as well at 3.5. Off dextrose yesterday and changed to NS with K.   Sp surgery  Has significant NG tube output which typically causes Hypernatremia from output. CO2 improving- now 29  Low Ca and Albumin normal.  Give IV Ca and check iCa and Vit D>     Assessment :     Hypernatremia  Sodium peaked to 150  Now 136  On D5 with potassium     Acute Kidney Injury  Prerenal  Also was on NSAID's and lisinopril at home  Cr peaked to 3.5- now normal  UA- bland, urine sodium was <20 on admission  Renal imagin/20: R- 9.4 cm, L- 11.1 cm  Has multiple cysts    Also has acute urinary retention- now has Stout    Hypokalemia  Due to GI loss  NG tube suction  Replace IV-since he is n.p.o. Hypertension   BP: (96-98)/(50-59)  Pulse:  [79-80]   BP on low side    Alkalosis  Metabolic   Due to GI suction  K correction will help  Also PPI will help    Small bowel obstruction- followed by surgery- surgery done on 20  Currently on supportive treatment    Omental internal hernia w/closed loop SOB, reduction done, extensive interloop  Adhesions incided on 20      [facility] Nephrology would like to thank Ashok Bradford MD   for opportunity to serve this patient      Please call with questions at-   24 Hrs Answering service [phone] or  7 am- 5 pm via Perfect serve or cell phone  Shirley Verduzco          CC/reason for consult :     hypernatremia     HPI :     From consult note-     Molly Jean is a 79 y.o. male presented to   the hospital on 2020 with pain abdomen,nausea  And vomiting. He was constipated, and has had no  BM for about a week. Also has had abdominal distention  Before coming to the hospital.Now has NG tube, getting  Better. His distended abdomen getting better. Meanwhile we are consulted for hyponatremia and   Related issues.      Interval History:     Had surgery

## 2020-02-09 NOTE — PROGRESS NOTES
Pt alert and oriented. Pt very anxious, wanting to take NG tube out and go home, states \"not improving. \" Pt educated on use of NG tube and clamping trials prior to taking NG out. Pt verbalized understanding. Assessment completed as charted. Abdominal dressing intact with old drainage. Pt c/o abdominal pain 7/10, prn IV pain medication given per MAR. Pt also c/o slight nausea, antiemetic given per MAR. Stout intact. Pt repositioned for comfort. Resting in bed, denies further needs at present time. Call light and bedside table within reach. Bed locked and in lowest position. Will continue to monitor.

## 2020-02-09 NOTE — PROGRESS NOTES
Hypertension. has a past surgical history that includes Small intestine surgery (N/A, 2/6/2020). Restrictions  Restrictions/Precautions  Restrictions/Precautions: General Precautions, Fall Risk, Up as Tolerated    Subjective   General  Chart Reviewed: Yes  Patient assessed for rehabilitation services?: Yes  Family / Caregiver Present: Yes(Pt's son)  Referring Practitioner: Carolina Shin MD  Diagnosis: SBO, S/p exp lap and MARIANN on 2/6/20  Subjective  Subjective: Pt up in chair on arrival, pleasant and agreeable to eval and treat  General Comment  Comments: Per RN okay to treat  Patient Currently in Pain: Yes  Pain Assessment  Pain Assessment: 0-10  Pain Level: 8(pt reports increased pain with movement)  Pain Type: Surgical pain;Acute pain  Pain Location: Abdomen  Pain Orientation: Lower  Pain Descriptors: Aching;Constant; Sore  Non-Pharmaceutical Pain Intervention(s): Repositioned;Splinting;Emotional support;Rest;Ambulation/Increased Activity  Response to Pain Intervention: Patient Satisfied  Vital Signs  Patient Currently in Pain: Yes  Social/Functional History  Social/Functional History  Lives With: Family(Wife, son (son is available for 24/7 supervision/assist at d/c))  Type of Home: House  Home Layout: Able to Live on Main level with bedroom/bathroom, One level  Home Access: Stairs to enter with rails  Entrance Stairs - Number of Steps: 4  Entrance Stairs - Rails: Left(ascending)  Bathroom Shower/Tub: Walk-in shower  Bathroom Equipment: Shower chair, Grab bars in shower  Home Equipment: (may have some equipment, stored unsure what type of walkers/canes)  ADL Assistance: Independent  Homemaking Responsibilities: Yes(Pt shares homemaking with son)  Ambulation Assistance: Independent  Transfer Assistance: Independent  Active : Yes  Occupation: Retired  Type of occupation:  at [employer]   IADL Comments: Assists with care for wife (per pt, wife is \"bedridden\") Objective   Vision: Impaired  Vision Exceptions: Wears glasses for reading  Hearing: Exceptions to [facility]  Hearing Exceptions: Hard of hearing/hearing concerns    Orientation  Overall Orientation Status: Within Functional Limits     Balance  Sitting Balance: Supervision  Standing Balance: Moderate assistance  Standing Balance  Time: 1-2 minutes, 2-3 minutes, <1 minute over multiple trials  Activity: mobility, transfers  Functional Mobility  Functional - Mobility Device: Rolling Walker  Activity: Other  Assist Level: Dependent/Total(Min-Mod A x2)  ADL  Feeding: Setup(for ice chips)  LE Dressing: Maximum assistance;Setup  Toileting: Dependent/Total(benito)  Tone RUE  RUE Tone: Normotonic  Tone LUE  LUE Tone: Normotonic  Coordination  Movements Are Fluid And Coordinated: Yes     Bed mobility  Supine to Sit: Unable to assess(pt up in chair on arrival)  Sit to Supine: Minimal assistance(cues for technique)  Transfers  Sit to stand: Dependent/Total(Min-Mod A x2)  Stand to sit: Dependent/Total(Min-Mod A x2)     Cognition  Overall Cognitive Status: Exceptions  Arousal/Alertness: Delayed responses to stimuli  Following Commands: Follows one step commands with increased time; Follows one step commands with repetition(pt is [place])  Attention Span: Attends with cues to redirect  Problem Solving: Assistance required to generate solutions  Initiation: Requires cues for some        Sensation  Overall Sensation Status: WFL        LUE AROM (degrees)  LUE AROM : WFL  Left Hand AROM (degrees)  Left Hand AROM: WFL  RUE AROM (degrees)  RUE AROM : WFL  Right Hand AROM (degrees)  Right Hand AROM: WFL                      Plan   Plan  Times per week: 3-5x/week   Current Treatment Recommendations: Strengthening, Balance Training, Functional Mobility Training, Endurance Training, Equipment Evaluation, Education, & procurement, Patient/Caregiver Education & Training, Self-Care / ADL, Gait Training, Safety Education & Training, Positioning, Pain

## 2020-02-09 NOTE — PROGRESS NOTES
Physical Therapy    Facility/Department: [facility] C3 TELE/MED SURG/ONC  Initial Assessment/Treatment    NAME: Molly Jean  : 1940  MRN: [id number]    Date of Service: 2020    Discharge Recommendations:  Subacute/Skilled Nursing Facility, Continue to assess pending progress   PT Equipment Recommendations  Equipment Needed: No     Assessment   Body structures, Functions, Activity limitations: Decreased functional mobility ; Decreased posture;Decreased endurance;Decreased cognition;Decreased strength;Decreased balance  Assessment: Pt is a 79 y.o. male admitted to [facility] secondary to SBO s/p ex lap and extensive lysis of adhesions 20. Pt lives at home with his family and was previously I with all functional mobility without an AD. Pt is currently functioning well below his baseline requiring grossly Vanessa/modA x 2 for all functional mobility with use of RW. Pt is limited during session d/t orthostatic BP (RN notified) as well as weakness and pain, therefore futher mobility limited secondary to safety concerns. Pt also demonstrates some confusion during session and requires frequent cues for safety with use of AD. Pt will benefit from continued skilled PT in acute care setting to address above deficits. At this time recommend SNF upon d/c d/t current level of mobility but will CTA pending further progress and improvements in BP with mobility. Treatment Diagnosis: Impaired functional mobility and gait  Specific instructions for Next Treatment: Progress mobility as tolerated, monitor BP  Prognosis: Good  Decision Making: Medium Complexity  PT Education: Goals; Energy Conservation;PT Role;General Safety;Plan of Care; Injury Prevention;Gait Training;Precautions; Functional Mobility Training;Transfer Training  Patient Education: Pt verbalized understanding  Barriers to Learning: Cognition  REQUIRES PT FOLLOW UP: Yes  Activity Tolerance  Activity Tolerance: Treatment limited secondary to medical complications (free text)  Activity Tolerance: Sitting in chair at start of session: BP 96/59, HR 96; Standing BP: 65/46 HR 90; Supine at EOS: /79, HR 79       Patient Diagnosis(es): The primary encounter diagnosis was GEOVANNA (acute kidney injury) ([facility] [address] Diagnoses of SBO (small bowel obstruction) ([facility] [address] Urinary retention, Constipation, unspecified constipation type, History of compression fracture of spine, and Ketonuria were also pertinent to this visit. has a past medical history of Hyperlipidemia and Hypertension. has a past surgical history that includes Small intestine surgery (N/A, 2/6/2020). Restrictions  Restrictions/Precautions  Restrictions/Precautions: General Precautions, Fall Risk, Up as Tolerated  Position Activity Restriction  Other position/activity restrictions: NG, 1L O2 NC, clear liquid diet  Vision/Hearing  Vision: Impaired  Vision Exceptions: Wears glasses for reading  Hearing: Exceptions to [facility]  Hearing Exceptions: Hard of hearing/hearing concerns     Subjective  General  Chart Reviewed: Yes  Patient assessed for rehabilitation services?: Yes  Response To Previous Treatment: Not applicable  Family / Caregiver Present: Yes(Son)  Referring Practitioner: Israel Barraza MD  Referral Date : 02/09/20  Diagnosis: SBO s/p ex lap and extensive lysis of adhesions 2/07  Follows Commands: Within Functional Limits  General Comment  Comments: RN cleared pt for session  Subjective  Subjective: Pt sitting in chair on approach, pleasant and agreeable to PT evalulation. Pt oriented but demonstrates some confusion during session  Pain Screening  Patient Currently in Pain: Yes  Pain Assessment  Pain Assessment: 0-10  Pain Level: 6  Pain Type: Surgical pain;Acute pain  Pain Location: Abdomen  Non-Pharmaceutical Pain Intervention(s): Ambulation/Increased Activity;Repositioned; Therapeutic presence  Vital Signs  Patient Currently in Pain: Yes  Pre Treatment Pain Screening  Intervention List: Patient able to continue with decreased step length/height, B decreased toe clearance, B decreased TKE, forward flexed trunk. Pt is unsteady with intermittent posterior LOB with up to landen/modA to correct. Gait Deviations: Slow Nelli; Increased AMANDA; Decreased step length;Decreased step height;Shuffles  Distance: 5' (chair to EOB)  Comments: Pt requires cues and assistance to maintain RW within safe , cues for sequence and safety. Balance  Posture: Fair  Sitting - Static: Good;-  Sitting - Dynamic: Good;-  Standing - Static: Fair;-  Standing - Dynamic: Poor;+  Comments: Pt completes x 1 bout static standing balance with RW with CGA to Landen to maintain balance, pt demonstrates no LOB, maintains x 1 minute. Completed for improved strength and tolerance to standing to prepare for gait activities. Plan   Plan  Times per week: 3-5x/wk  Times per day: Daily  Specific instructions for Next Treatment: Progress mobility as tolerated, monitor BP  Current Treatment Recommendations: Strengthening, Gait Training, Patient/Caregiver Education & Training, ROM, Stair training, Equipment Evaluation, Education, & procurement, Balance Training, Neuromuscular Re-education, Functional Mobility Training, Endurance Training, Home Exercise Program, Transfer Training, Safety Education & Training  Safety Devices  Type of devices:  All fall risk precautions in place, Bed alarm in place, Call light within reach, Nurse notified, Gait belt, Patient at risk for falls, Left in bed  Restraints  Initially in place: No      AM-PAC Score  AM-PAC Inpatient Mobility Raw Score : 14 (02/09/20 1246)  AM-PAC Inpatient T-Scale Score : 38.1 (02/09/20 1246)  Mobility Inpatient CMS 0-100% Score: 61.29 (02/09/20 1246)  Mobility Inpatient CMS G-Code Modifier : CL (02/09/20 1246)          Goals  Short term goals  Time Frame for Short term goals: 1 week 2/16/20 (unles otherwise specified)  Short term goal 1: Pt will complete supine to/from sit with MI  Short term goal 2: Pt will

## 2020-02-09 NOTE — PLAN OF CARE
Pt a/o, rates pain appropriately using 0-10 pain scale; pt calls out as needed for pain intervention; will continue to monitor and administer intervention as ordered and requested. Sabrina Noyola

## 2020-02-10 LAB
ANION GAP SERPL CALCULATED.3IONS-SCNC: 9 MMOL/L (ref 3–16)
BASOPHILS ABSOLUTE: 0 K/UL (ref 0–0.2)
BASOPHILS RELATIVE PERCENT: 0.2 %
BUN BLDV-MCNC: 12 MG/DL (ref 7–20)
CALCIUM SERPL-MCNC: 7.5 MG/DL (ref 8.3–10.6)
CHLORIDE BLD-SCNC: 101 MMOL/L (ref 99–110)
CO2: 26 MMOL/L (ref 21–32)
CREAT SERPL-MCNC: 0.6 MG/DL (ref 0.8–1.3)
EOSINOPHILS ABSOLUTE: 0.2 K/UL (ref 0–0.6)
EOSINOPHILS RELATIVE PERCENT: 1.7 %
GFR AFRICAN AMERICAN: >60
GFR NON-AFRICAN AMERICAN: >60
GLUCOSE BLD-MCNC: 95 MG/DL (ref 70–99)
HCT VFR BLD CALC: 28.9 % (ref 40.5–52.5)
HEMOGLOBIN: 9.8 G/DL (ref 13.5–17.5)
LYMPHOCYTES ABSOLUTE: 1.1 K/UL (ref 1–5.1)
LYMPHOCYTES RELATIVE PERCENT: 12.3 %
MCH RBC QN AUTO: 32.8 PG (ref 26–34)
MCHC RBC AUTO-ENTMCNC: 34.1 G/DL (ref 31–36)
MCV RBC AUTO: 96.1 FL (ref 80–100)
MONOCYTES ABSOLUTE: 1 K/UL (ref 0–1.3)
MONOCYTES RELATIVE PERCENT: 10.9 %
NEUTROPHILS ABSOLUTE: 7 K/UL (ref 1.7–7.7)
NEUTROPHILS RELATIVE PERCENT: 74.9 %
PDW BLD-RTO: 12.2 % (ref 12.4–15.4)
PLATELET # BLD: 277 K/UL (ref 135–450)
PMV BLD AUTO: 7.7 FL (ref 5–10.5)
POTASSIUM REFLEX MAGNESIUM: 3.6 MMOL/L (ref 3.5–5.1)
RBC # BLD: 3.01 M/UL (ref 4.2–5.9)
SODIUM BLD-SCNC: 136 MMOL/L (ref 136–145)
VITAMIN D 25-HYDROXY: 20.8 NG/ML
WBC # BLD: 9.3 K/UL (ref 4–11)

## 2020-02-10 PROCEDURE — C9113 INJ PANTOPRAZOLE SODIUM, VIA: HCPCS | Performed by: SURGERY

## 2020-02-10 PROCEDURE — 6360000002 HC RX W HCPCS: Performed by: INTERNAL MEDICINE

## 2020-02-10 PROCEDURE — 97116 GAIT TRAINING THERAPY: CPT

## 2020-02-10 PROCEDURE — 6360000002 HC RX W HCPCS: Performed by: SURGERY

## 2020-02-10 PROCEDURE — 6370000000 HC RX 637 (ALT 250 FOR IP): Performed by: SURGERY

## 2020-02-10 PROCEDURE — 97530 THERAPEUTIC ACTIVITIES: CPT

## 2020-02-10 PROCEDURE — 97110 THERAPEUTIC EXERCISES: CPT

## 2020-02-10 PROCEDURE — 2580000003 HC RX 258: Performed by: SURGERY

## 2020-02-10 PROCEDURE — 80048 BASIC METABOLIC PNL TOTAL CA: CPT

## 2020-02-10 PROCEDURE — 99024 POSTOP FOLLOW-UP VISIT: CPT | Performed by: SURGERY

## 2020-02-10 PROCEDURE — 36415 COLL VENOUS BLD VENIPUNCTURE: CPT

## 2020-02-10 PROCEDURE — 1200000000 HC SEMI PRIVATE

## 2020-02-10 PROCEDURE — 85025 COMPLETE CBC W/AUTO DIFF WBC: CPT

## 2020-02-10 RX ORDER — SODIUM CHLORIDE AND POTASSIUM CHLORIDE .9; .15 G/100ML; G/100ML
SOLUTION INTRAVENOUS CONTINUOUS
Status: DISCONTINUED | OUTPATIENT
Start: 2020-02-10 | End: 2020-02-11

## 2020-02-10 RX ORDER — OXYCODONE HYDROCHLORIDE 5 MG/1
5 TABLET ORAL EVERY 4 HOURS PRN
Status: DISCONTINUED | OUTPATIENT
Start: 2020-02-10 | End: 2020-02-14 | Stop reason: HOSPADM

## 2020-02-10 RX ORDER — OXYCODONE HYDROCHLORIDE 5 MG/1
10 TABLET ORAL EVERY 4 HOURS PRN
Status: DISCONTINUED | OUTPATIENT
Start: 2020-02-10 | End: 2020-02-14 | Stop reason: HOSPADM

## 2020-02-10 RX ADMIN — POTASSIUM CHLORIDE AND SODIUM CHLORIDE: 900; 150 INJECTION, SOLUTION INTRAVENOUS at 12:37

## 2020-02-10 RX ADMIN — PANTOPRAZOLE SODIUM 40 MG: 40 INJECTION, POWDER, FOR SOLUTION INTRAVENOUS at 08:16

## 2020-02-10 RX ADMIN — ONDANSETRON 4 MG: 2 INJECTION INTRAMUSCULAR; INTRAVENOUS at 08:18

## 2020-02-10 RX ADMIN — MORPHINE SULFATE 4 MG: 4 INJECTION, SOLUTION INTRAMUSCULAR; INTRAVENOUS at 08:17

## 2020-02-10 RX ADMIN — MORPHINE SULFATE 4 MG: 4 INJECTION, SOLUTION INTRAMUSCULAR; INTRAVENOUS at 17:58

## 2020-02-10 RX ADMIN — Medication 10 ML: at 08:16

## 2020-02-10 RX ADMIN — POTASSIUM CHLORIDE AND SODIUM CHLORIDE: 900; 150 INJECTION, SOLUTION INTRAVENOUS at 04:37

## 2020-02-10 RX ADMIN — OXYCODONE 10 MG: 5 TABLET ORAL at 14:36

## 2020-02-10 RX ADMIN — ENOXAPARIN SODIUM 40 MG: 40 INJECTION SUBCUTANEOUS at 08:20

## 2020-02-10 RX ADMIN — ONDANSETRON 4 MG: 2 INJECTION INTRAMUSCULAR; INTRAVENOUS at 17:58

## 2020-02-10 RX ADMIN — MORPHINE SULFATE 4 MG: 4 INJECTION, SOLUTION INTRAMUSCULAR; INTRAVENOUS at 11:15

## 2020-02-10 ASSESSMENT — PAIN SCALES - GENERAL
PAINLEVEL_OUTOF10: 8
PAINLEVEL_OUTOF10: 0
PAINLEVEL_OUTOF10: 7
PAINLEVEL_OUTOF10: 7
PAINLEVEL_OUTOF10: 8

## 2020-02-10 NOTE — PROGRESS NOTES
Hospitalist Progress Note      PCP: Elva Hill    Date of Admission: 2/2/2020    Chief Complaint: Abdominal pain, constipation and vomiting    Hospital Course: H & P  reviewed. Patient admitted with SBO and GEOVANNA. Subjective:      minimal abd pain. Denied any N/V. Pt worked with PT today. Medications:  Reviewed    Infusion Medications    0.9% NaCl with KCl 20 mEq 100 mL/hr at 02/10/20 0437     Scheduled Medications    calcium gluconate IVPB  2 g Intravenous Once    pantoprazole  40 mg Intravenous Daily    enoxaparin  40 mg Subcutaneous Daily    sodium chloride flush  10 mL Intravenous 2 times per day     PRN Meds: sodium phosphate IVPB **OR** sodium phosphate IVPB, morphine **OR** morphine, potassium chloride **OR** potassium alternative oral replacement **OR** potassium chloride, LORazepam, phenol, ondansetron, sodium chloride flush      Intake/Output Summary (Last 24 hours) at 2/10/2020 0804  Last data filed at 2/10/2020 0500  Gross per 24 hour   Intake 4739 ml   Output 1005 ml   Net 3734 ml       Physical Exam Performed:    BP (!) 94/56   Pulse 74   Temp 98.8 °F (37.1 °C) (Oral)   Resp 18   Ht 6' 1\" (1.854 m)   Wt 157 lb 3 oz (71.3 kg)   SpO2 94%   BMI 20.74 kg/m²     General appearance: No apparent distress, appears stated age and cooperative. HEENT: Pupils equal, round,  Conjunctivae/corneas clear. Neck: Supple, with full range of motion. No jugular venous distention. Trachea midline. Respiratory:  Normal respiratory effort. Clear to auscultation, bilaterally without Rales/Wheezes/Rhonchi. Cardiovascular: Regular rate and rhythm with normal S1/S2 without murmurs, rubs or gallops. Abdomen: Soft, non tender, non distended, midline incision site with staples in place. Hypoactive BS. Musculoskeletal: No clubbing, cyanosis or edema bilaterally. Skin: Warm and dry  Neurologic: Alert, speech clear with no overt facial droop.     Psychiatric: Alert, not anxious at home but unable to take as NPO. Continue dose  IV ativan PRN QHS       Hypokalemia: Replacing IV. Hypophosphatemia: will replace per phosphorus replacement protocol ordered. DVT Prophylaxis: Heparin  Diet: DIET CLEAR LIQUID;  Code Status: Full Code    PT/OT Eval Status: Not indicated at this time    Dispo - hard to say,  pending clinical course  Primary service switched to Gen surg.      Luciano Galan MD

## 2020-02-10 NOTE — PROGRESS NOTES
Occupational Therapy  Facility/Department: [facility] C3 TELE/MED SURG/ONC  Daily Treatment Note  NAME: Sumeet Sexton  : 1940  MRN: [id number]    Date of Service: 2/10/2020    Discharge Recommendations:  Subacute/Skilled Nursing Facility       Assessment   Performance deficits / Impairments: Decreased functional mobility ; Decreased balance;Decreased ADL status; Decreased endurance;Decreased high-level IADLs;Decreased cognition  Assessment: Pt min A x1 for bed mobility this date. Pt required min Ax2 for functional mobility and transfers this date. Pt required verbal cues for safety. Pt declined ADLs this date. Once return to chair, pt performed AROM to increase strength and endurance for ADLs. Pt would benefit from further OT services upon d/c to return to OF. Pt BP was taken through out this session. After walk in room pt BP was 88/59. Pt was seated and performe AROM and pt BP was assessed 98/60. Pt no complaints of dizziness. Pt left in chair  Prognosis: Good  Decision Making: Medium Complexity  REQUIRES OT FOLLOW UP: Yes  Activity Tolerance  Activity Tolerance: Patient Tolerated treatment well  Safety Devices  Safety Devices in place: Yes  Type of devices: Left in chair;Call light within reach; Chair alarm in place;Nurse notified;Gait belt         Patient Diagnosis(es): The primary encounter diagnosis was GEOVANNA (acute kidney injury) ([facility] [address] Diagnoses of SBO (small bowel obstruction) ([facility] [address] Urinary retention, Constipation, unspecified constipation type, History of compression fracture of spine, and Ketonuria were also pertinent to this visit. has a past medical history of Hyperlipidemia and Hypertension. has a past surgical history that includes Small intestine surgery (N/A, 2020).     Restrictions  Restrictions/Precautions  Restrictions/Precautions: General Precautions, Fall Risk, Up as Tolerated  Position Activity Restriction  Other position/activity restrictions: NG, 1L O2 NC, clear liquid diet Subjective   General  Chart Reviewed: Yes  Patient assessed for rehabilitation services?: Yes  Response to previous treatment: Patient with no complaints from previous session  Family / Caregiver Present: No  Referring Practitioner: Scott Colmenares MD  Diagnosis: SBO, S/p exp lap and MARIANN on 2/6/20  Subjective  Subjective: Pt in bed upon arrival and agreeable  General Comment  Comments: Per RN okay to treat     Vital Signs  Patient Currently in Pain: Denies     Orientation  Orientation  Overall Orientation Status: Within Functional Limits     Objective    ADL  Toileting: Dependent/Total(benito)       Balance  Sitting Balance: Supervision  Standing Balance: Dependent/Total(Min Ax2)    Functional Mobility  Functional - Mobility Device: Rolling Walker  Activity: Other(within room)  Assist Level: Dependent/Total(min A x2)    Bed mobility  Supine to Sit: Minimal assistance(HOB elevated)     Transfers  Sit to stand: 2 Person assistance;Minimal assistance  Stand to sit: Minimal assistance;Dependent/Total;2 Person assistance     Cognition  Overall Cognitive Status: Exceptions  Arousal/Alertness: Delayed responses to stimuli  Following Commands: Follows one step commands with increased time; Follows one step commands with repetition  Attention Span: Attends with cues to redirect  Problem Solving: Assistance required to generate solutions  Initiation: Requires cues for some     Type of ROM/Therapeutic Exercise  Type of ROM/Therapeutic Exercise: AROM  Comment: B UE seated  Exercises  Horizontal ABduction: x10  Horizontal ADduction: x10  Elbow Flexion: x10  Elbow Extension: x10  Supination: x10  Pronation: x10  Wrist Flexion: x10  Wrist Extension: x10  Grasp/Release: x10     Plan   Plan  Times per week: 3-5x/week   Current Treatment Recommendations: Strengthening, Balance Training, Functional Mobility Training, Endurance Training, Equipment Evaluation, Education, & procurement, Patient/Caregiver Education & Training, Self-Care / ADL, Gait Training, Safety Education & Training, Positioning, Pain Management, Home Management Training    AM-PAC Score  AM-PAC Inpatient Daily Activity Raw Score: 13 (02/10/20 1044)  AM-PAC Inpatient ADL T-Scale Score : 32.03 (02/10/20 1044)  ADL Inpatient CMS 0-100% Score: 63.03 (02/10/20 1044)  ADL Inpatient CMS G-Code Modifier : CL (02/10/20 1044)    Goals  Short term goals  Time Frame for Short term goals: 1 week (by 2/16/20)  Short term goal 1: Pt will complete functional transfers with SBA  Short term goal 2: Pt will complete LE dressing with Min A  Short term goal 3: Pt will perform 3-5 minutes dynamic standing activity with CGA by 2/13  Patient Goals   Patient goals : \"to go home\"       Therapy Time   Individual Concurrent Group Co-treatment   Time In 0833         Time Out 0911         Minutes 38         Timed Code Treatment Minutes: DIONTE Proctor

## 2020-02-10 NOTE — PROGRESS NOTES
[facility] GENERAL SURGERY    Surgery Progress Note           POD # 5    PATIENT NAME: Madison Camarena     TODAY'S DATE: 2/10/2020    INTERVAL HISTORY:    Pt  Had some loose bms. OBJECTIVE:   VITALS:  /62   Pulse 78   Temp 97.7 °F (36.5 °C) (Oral)   Resp 16   Ht 6' 1\" (1.854 m)   Wt 157 lb 3 oz (71.3 kg)   SpO2 95%   BMI 20.74 kg/m²     INTAKE/OUTPUT:    I/O last 3 completed shifts: In: 4739 [P.O.:660; I.V.:4079]  Out: 1005 [Urine:555; Emesis/NG output:450]  I/O this shift: In: 12 [P.O.:560]  Out: -               CONSTITUTIONAL:  awake and alert  LUNGS:  no crackles or wheezing  ABDOMEN:   normal bowel sounds, soft, non-distended, minimal tender   INCISION: dry    Data:  CBC:   Recent Labs     02/08/20  0603 02/09/20  0645 02/10/20  0558   WBC 8.8 8.3 9.3   HGB 10.9* 10.0* 9.8*   HCT 32.4* 28.9* 28.9*    233 277     BMP:    Recent Labs     02/08/20  0603 02/09/20  0645 02/10/20  0558   * 133* 136   K 3.4* 3.5 3.6   CL 92* 96* 101   CO2 32 29 26   BUN 18 14 12   CREATININE 0.7* 0.6* 0.6*   GLUCOSE 122* 86 95     Hepatic: No results for input(s): AST, ALT, ALB, BILITOT, ALKPHOS in the last 72 hours. Mag:      Recent Labs     02/08/20  0603 02/09/20  0645   MG 2.40 2.20      Phos:     Recent Labs     02/08/20  0603   PHOS 1.5*      INR: No results for input(s): INR in the last 72 hours. Radiology Review:  NA    ASSESSMENT AND PLAN:  79 y.o. male status post radha  1. Full liquid diet  2. Po pain control  3.   PT/OT         Electronically signed by Rafita Nichols MD

## 2020-02-10 NOTE — PROGRESS NOTES
Pt a/o. Stout to gravity with output. PT state nausea; no emesis. Medicated per MAR. Rated abd, surgical pain 8 out of 10. Medicated per MAR. Massive episode of diarrhea this AM; pt cleaned. Call light within reach; will continue to monitor.

## 2020-02-10 NOTE — PROGRESS NOTES
[facility]    [facility]              [phone]                       Plan :     Sodium is stable  K stable  Decrease dextrose drip  Gradually taper  Had diarrhea/incontinence am  Low calcium  Sp calcium gluconate  Follow again    Assessment :     Hypernatremia  Sodium peaked to 150  Now 136- stable  On D5 with potassium     Acute Kidney Injury  Prerenal  Also was on NSAID's and lisinopril at home  Cr peaked to 3.5- now normal  UA- bland, urine sodium was <20 on admission  Renal imagin/20: R- 9.4 cm, L- 11.1 cm  Has multiple cysts    Also has acute urinary retention- now has Stout    Hypokalemia  Due to GI loss  NG tube suction  Replace IV-since he is n.p.o. Stable    hypotension   BP: (108)/(62)  Pulse:  [78]   BP on low side  Says this is his baseline    Alkalosis  Metabolic   improved    Small bowel obstruction- followed by surgery- surgery done on 20  Currently on supportive treatment    Omental internal hernia w/closed loop SOB, reduction done, extensive interloop  Adhesions incided on 20      [facility] would like to thank Dai Araujo MD   for opportunity to serve this patient      Please call with questions at-   24 Hrs Answering service [phone] or  7 am- 5 pm via Perfect serve or cell phone  Dr.Sudhir Ariel Loera          CC/reason for consult :     hypernatremia     HPI :     From consult note-     Vernell Gauthier is a 79 y.o. male presented to   the hospital on 2020 with pain abdomen,nausea  And vomiting. He was constipated, and has had no  BM for about a week. Also has had abdominal distention  Before coming to the hospital.Now has NG tube, getting  Better. His distended abdomen getting better. Meanwhile we are consulted for hyponatremia and   Related issues.      Interval History:     Off ng tube  Had BM today  Able to eat    ROS:     Seen with- son  SOB-   none  Edema-   none  Nausea/vomiting/diarrhea-   none  Poor appetite/oral

## 2020-02-10 NOTE — PROGRESS NOTES
Pt NG removed and tolerating clears. Pt had low output and nephrology called. Keeping benito catheter r/t retention. See new orders.

## 2020-02-10 NOTE — PROGRESS NOTES
Pt alert and oriented. Assessment completed as charted. Abdominal incision C/D/I. Pt with active bowel sounds, states passing some flatus. Suppository given per MAR, no BM noted yet. Stout intact draining cesar/tea colored urine. Pt repositioned for comfort. Resting in bed, no c/o pain or nausea at present time. Tolerating clear liquids thus far. Denies any needs. Call light and bedside table within reach. Bed locked and in lowest position. Will continue to monitor.

## 2020-02-10 NOTE — CARE COORDINATION
Chart reviewed day 8. NGT out sitting up in chair. Therapy recommended SNF. Discussed with patient, provided SNF list. Unclear if patient will be agreeable to sNF. Will check back with patient as time allows. Ruddy Winters RN    Spoke with patient and son. Discussed SNF rec's. [place] [facility] and 5 star rating. Referral made. Waiting determination. Ruddy Winters RN      [facility] [place]. Full liquid diet today. NGT out. Following.  Ruddy Winters RN

## 2020-02-10 NOTE — PROGRESS NOTES
progressing  Short term goal 2: Pt will complete sit to/from stand with LRAD with CGA; 2/10 progressing  Short term goal 3: Pt will ambulate 48' with LRAD with CGA without LOB; 2/10 progressing ambulation  Short term goal 4: Pt will ascend/descend 4 steps with LHR and CGA without LOB; 2/1 not assessed  Short term goal 5: 2/12/20: Pt will participate in 12-15 reps BLE exercises to increase strength and increase I with functional mobility; 2/1 0ongoing  Patient Goals   Patient goals : \"Go home\"    Plan    Plan  Times per week: 3-5x/wk  Times per day: Daily  Specific instructions for Next Treatment: Progress mobility as tolerated, monitor BP  Current Treatment Recommendations: Strengthening, Gait Training, Patient/Caregiver Education & Training, ROM, Stair training, Equipment Evaluation, Education, & procurement, Balance Training, Neuromuscular Re-education, Functional Mobility Training, Endurance Training, Home Exercise Program, Transfer Training, Safety Education & Training  Safety Devices  Type of devices:  All fall risk precautions in place, Call light within reach, Chair alarm in place, Gait belt, Left in chair, Nurse notified  Restraints  Initially in place: No     Therapy Time   Individual Concurrent Group Co-treatment   Time In 0833         Time Out 0911         Minutes [address] [place], [place] [id number]

## 2020-02-11 LAB
ANION GAP SERPL CALCULATED.3IONS-SCNC: 5 MMOL/L (ref 3–16)
BASOPHILS ABSOLUTE: 0 K/UL (ref 0–0.2)
BASOPHILS RELATIVE PERCENT: 0.2 %
BUN BLDV-MCNC: 10 MG/DL (ref 7–20)
CALCIUM SERPL-MCNC: 7.6 MG/DL (ref 8.3–10.6)
CHLORIDE BLD-SCNC: 103 MMOL/L (ref 99–110)
CO2: 26 MMOL/L (ref 21–32)
CREAT SERPL-MCNC: 0.6 MG/DL (ref 0.8–1.3)
EOSINOPHILS ABSOLUTE: 0.2 K/UL (ref 0–0.6)
EOSINOPHILS RELATIVE PERCENT: 2 %
GFR AFRICAN AMERICAN: >60
GFR NON-AFRICAN AMERICAN: >60
GLUCOSE BLD-MCNC: 98 MG/DL (ref 70–99)
HCT VFR BLD CALC: 27.7 % (ref 40.5–52.5)
HEMOGLOBIN: 9.4 G/DL (ref 13.5–17.5)
LYMPHOCYTES ABSOLUTE: 1.7 K/UL (ref 1–5.1)
LYMPHOCYTES RELATIVE PERCENT: 15.9 %
MCH RBC QN AUTO: 32.9 PG (ref 26–34)
MCHC RBC AUTO-ENTMCNC: 33.9 G/DL (ref 31–36)
MCV RBC AUTO: 96.9 FL (ref 80–100)
MONOCYTES ABSOLUTE: 1.1 K/UL (ref 0–1.3)
MONOCYTES RELATIVE PERCENT: 10.6 %
NEUTROPHILS ABSOLUTE: 7.4 K/UL (ref 1.7–7.7)
NEUTROPHILS RELATIVE PERCENT: 71.3 %
PDW BLD-RTO: 11.9 % (ref 12.4–15.4)
PLATELET # BLD: 295 K/UL (ref 135–450)
PMV BLD AUTO: 7.2 FL (ref 5–10.5)
POTASSIUM REFLEX MAGNESIUM: 4 MMOL/L (ref 3.5–5.1)
RBC # BLD: 2.86 M/UL (ref 4.2–5.9)
SODIUM BLD-SCNC: 134 MMOL/L (ref 136–145)
WBC # BLD: 10.4 K/UL (ref 4–11)

## 2020-02-11 PROCEDURE — 6360000002 HC RX W HCPCS: Performed by: SURGERY

## 2020-02-11 PROCEDURE — APPSS45 APP SPLIT SHARED TIME 31-45 MINUTES: Performed by: CLINICAL NURSE SPECIALIST

## 2020-02-11 PROCEDURE — C9113 INJ PANTOPRAZOLE SODIUM, VIA: HCPCS | Performed by: SURGERY

## 2020-02-11 PROCEDURE — 6370000000 HC RX 637 (ALT 250 FOR IP): Performed by: SURGERY

## 2020-02-11 PROCEDURE — 99024 POSTOP FOLLOW-UP VISIT: CPT | Performed by: SURGERY

## 2020-02-11 PROCEDURE — 51798 US URINE CAPACITY MEASURE: CPT

## 2020-02-11 PROCEDURE — 2580000003 HC RX 258: Performed by: INTERNAL MEDICINE

## 2020-02-11 PROCEDURE — 1200000000 HC SEMI PRIVATE

## 2020-02-11 PROCEDURE — 80048 BASIC METABOLIC PNL TOTAL CA: CPT

## 2020-02-11 PROCEDURE — 2580000003 HC RX 258: Performed by: SURGERY

## 2020-02-11 PROCEDURE — 36415 COLL VENOUS BLD VENIPUNCTURE: CPT

## 2020-02-11 PROCEDURE — 85025 COMPLETE CBC W/AUTO DIFF WBC: CPT

## 2020-02-11 PROCEDURE — 6360000002 HC RX W HCPCS: Performed by: INTERNAL MEDICINE

## 2020-02-11 PROCEDURE — 2580000003 HC RX 258: Performed by: NURSE PRACTITIONER

## 2020-02-11 RX ORDER — 0.9 % SODIUM CHLORIDE 0.9 %
500 INTRAVENOUS SOLUTION INTRAVENOUS ONCE
Status: COMPLETED | OUTPATIENT
Start: 2020-02-11 | End: 2020-02-11

## 2020-02-11 RX ORDER — SODIUM CHLORIDE 9 MG/ML
INJECTION, SOLUTION INTRAVENOUS CONTINUOUS
Status: DISCONTINUED | OUTPATIENT
Start: 2020-02-11 | End: 2020-02-14

## 2020-02-11 RX ADMIN — PANTOPRAZOLE SODIUM 40 MG: 40 INJECTION, POWDER, FOR SOLUTION INTRAVENOUS at 07:59

## 2020-02-11 RX ADMIN — OXYCODONE 10 MG: 5 TABLET ORAL at 21:11

## 2020-02-11 RX ADMIN — MORPHINE SULFATE 4 MG: 4 INJECTION, SOLUTION INTRAMUSCULAR; INTRAVENOUS at 18:53

## 2020-02-11 RX ADMIN — SODIUM CHLORIDE 500 ML: 9 INJECTION, SOLUTION INTRAVENOUS at 03:55

## 2020-02-11 RX ADMIN — Medication 10 ML: at 07:59

## 2020-02-11 RX ADMIN — MORPHINE SULFATE 4 MG: 4 INJECTION, SOLUTION INTRAMUSCULAR; INTRAVENOUS at 04:52

## 2020-02-11 RX ADMIN — POTASSIUM CHLORIDE AND SODIUM CHLORIDE: 900; 150 INJECTION, SOLUTION INTRAVENOUS at 08:00

## 2020-02-11 RX ADMIN — SODIUM CHLORIDE: 9 INJECTION, SOLUTION INTRAVENOUS at 16:25

## 2020-02-11 RX ADMIN — MORPHINE SULFATE 4 MG: 4 INJECTION, SOLUTION INTRAMUSCULAR; INTRAVENOUS at 07:59

## 2020-02-11 RX ADMIN — MORPHINE SULFATE 4 MG: 4 INJECTION, SOLUTION INTRAMUSCULAR; INTRAVENOUS at 12:46

## 2020-02-11 RX ADMIN — ONDANSETRON 4 MG: 2 INJECTION INTRAMUSCULAR; INTRAVENOUS at 18:53

## 2020-02-11 RX ADMIN — ONDANSETRON 4 MG: 2 INJECTION INTRAMUSCULAR; INTRAVENOUS at 04:52

## 2020-02-11 ASSESSMENT — PAIN SCALES - GENERAL
PAINLEVEL_OUTOF10: 0
PAINLEVEL_OUTOF10: 9
PAINLEVEL_OUTOF10: 7
PAINLEVEL_OUTOF10: 8

## 2020-02-11 ASSESSMENT — PAIN DESCRIPTION - ORIENTATION: ORIENTATION: MID

## 2020-02-11 ASSESSMENT — PAIN DESCRIPTION - PAIN TYPE: TYPE: SURGICAL PAIN;ACUTE PAIN

## 2020-02-11 ASSESSMENT — PAIN DESCRIPTION - LOCATION: LOCATION: ABDOMEN

## 2020-02-11 NOTE — PLAN OF CARE
Pt A&Ox4. Pt can appropriately score pain on 0-10 pain scale. Pt denies pain this shift. Will continue to monitor.

## 2020-02-11 NOTE — PROGRESS NOTES
Shift assessment completed per documentation. Pt A&Ox4. VSS. Pt awake in bed. Pt denies pain and discomfort at this time. Abdomen soft and flat. Pt reports tenderness. Bowel sounds active in all four quadrants. Midline surgical incision closed with staples and DIONTE, c/d/i. Stout catheter in place draining clear cesar urine. Bed locked and in lowest position. Bed alarm on. Nonskid slippers on. Side rails up 2/4. Call light and personal belongings within reach. Will continue to monitor.

## 2020-02-11 NOTE — PROGRESS NOTES
Secure message sent to Dr. Levi Champion urine output today. 175 mL of urine since 0530. Bladder scanner showed 26 mL; intake 120 mL of fluids. Pt had bolus last night r/t low urine output. \"

## 2020-02-11 NOTE — PROGRESS NOTES
Pt a/o. Pt ate 51-75% of liquid breakfast. Stated pain is 8 out of 10 and insists on Morphine. Pt sat in chair for a minimum of one hour. Back to bed. Bed locked in lowest position; side rails 2/4; call light within reach; will continue to monitor.

## 2020-02-11 NOTE — PLAN OF CARE
Problem: SKIN INTEGRITY  Goal: Skin integrity is maintained or improved  Outcome: Ongoing  Note:   No skin breakdown. Zinc ointment and barrier wipes used when soiled. Pt had bed bath, talon care, benito care, and oral care. Complete linen change as well. Encouraging pt to move from side to side while in bed.

## 2020-02-11 NOTE — CARE COORDINATION
Chart reviewed day 9. Plan for d/c to [facility]. Needs to tolerate po. Following.  Celestina Alejandre RN

## 2020-02-11 NOTE — PROGRESS NOTES
[facility] GENERAL SURGERY    Surgery Progress Note           POD # 6    PATIENT NAME: Tali Espino     TODAY'S DATE: 2/11/2020    INTERVAL HISTORY:    Large BM yesterday morning - has been up in chair more - taking roxicodone, but still feels he needs morphine. No nausea. OBJECTIVE:   VITALS:  BP (!) 96/59   Pulse 83   Temp 98.1 °F (36.7 °C) (Oral)   Resp 18   Ht 6' 1\" (1.854 m)   Wt 157 lb 3 oz (71.3 kg)   SpO2 92%   BMI 20.74 kg/m²     INTAKE/OUTPUT:    I/O last 3 completed shifts: In: 2899 [P.O.:940; I.V.:1959]  Out: 575 [Urine:575]  I/O this shift:  In: 10 [I.V.:10]  Out: -               CONSTITUTIONAL:  awake and alert  LUNGS:  no crackles or wheezing  ABDOMEN:   normal bowel sounds, soft, non-distended, minimal tender   INCISION: dry    Data:  CBC:   Recent Labs     02/09/20  0645 02/10/20  0558 02/11/20  0623   WBC 8.3 9.3 10.4   HGB 10.0* 9.8* 9.4*   HCT 28.9* 28.9* 27.7*    277 295     BMP:    Recent Labs     02/09/20  0645 02/10/20  0558 02/11/20  0623   * 136 134*   K 3.5 3.6 4.0   CL 96* 101 103   CO2 29 26 26   BUN 14 12 10   CREATININE 0.6* 0.6* 0.6*   GLUCOSE 86 95 98     Hepatic: No results for input(s): AST, ALT, ALB, BILITOT, ALKPHOS in the last 72 hours. Mag:      Recent Labs     02/09/20  0645   MG 2.20       ASSESSMENT AND PLAN:  79 y.o. male with SBO status post lysis of adhesions  GI: advance to low fiber diet   : urinary output picking up after bolus, monitor - continue with benito to monitor I/O  Pain: encourage PO pain control and limiting morphine  Severe protein and calorie malnutrition in setting of acute illness: encourage supplements in between meals. Activity: continue with PT/OT  Dispo: [facility] can accept the pt at d/c - perhaps tomorrow. Electronically signed by JAMEY Duek - ANGELI     Surgery Staff    I have examined this patient and read and agree with the note by Vandana Ellington CNP from today.     ZHANG Adsame YARBROUGH

## 2020-02-11 NOTE — PROGRESS NOTES
esophagus. Tube should be   advanced and reimaging should be considered to document appropriate   positioning. Left lower lung airspace disease suspicious of pneumonia versus atelectasis. CT ABDOMEN PELVIS WO CONTRAST Additional Contrast? None   Final Result   1. Small-bowel obstruction with a transition point likely in the pelvis. No   evidence of free air to suggest perforation. 2. Distended esophagus which is fluid filled likely related to reflux. 3. Stable position of Benito catheter in the urinary bladder. XR ABDOMEN (KUB) (SINGLE AP VIEW)   Final Result   1. Large amount of formed stool throughout the colon corresponding to given   history of constipation. 2. No bowel obstruction identified. Assessment/Plan:    Active Hospital Problems    Diagnosis    Severe malnutrition ([address] [E43]    Hypertension [I10]    Hyperlipidemia [E78.5]    GEOVANNA (acute kidney injury) ([address] [N17.9]    SBO (small bowel obstruction) ([address] [K56.609]     SBO :  Gen Surg assisting. Pt failed conservative management. S/p exp lap and MARIANN on 2/6. Continue mgt per surgery-   clear liquids, IVF, pain control PRN. Further advancement of diet per surgery      GEOVANNA : likely due to vol depletion, compounded by use of lisinopril, NSAIDs. Held lisinopril, avoid NSAIDs. Improving with IVF. Renal US with no hydronephrosis , left renal cysts. Continue IVF and avoid nephrotoxins. Creatinine normalized. Resolved      Acute urinary retention: likely related to SBO. Improved s/p benito placement. Voiding trial when more ambulatory     Hypernatremia: Likely due to volume depletion, n.p.o. status. Improved with IVF  Mgt per nephro. Na normalized. Hypokalemia: Likely due to GI losses. Mag normal.  Monitoring and replacing as needed      Hyperlipidemia: resume statin when no longer NPO. Hypotension: improved with IVF bolus. BP borderline low but asymptomatic. Continue IVF with boluses PRN. Lisinopril on hold. Insomnia: usually on  klonipin nightly at home but unable to take as NPO. Continue dose  IV ativan PRN QHS       Hypokalemia: Replacing IV. Hypophosphatemia: will replace per phosphorus replacement protocol ordered. Severe protein calorie malnutrition-dietitian input appreciated    DVT Prophylaxis: Heparin  Diet: DIET FULL LIQUID;  Code Status: Full Code    PT/OT Eval Status: Not indicated at this time    Dispo -possibly tomorrow per surgery  Primary service switched to Gen surg.      Zora Henderson MD

## 2020-02-11 NOTE — PROGRESS NOTES
Nutrition Assessment    Type and Reason for Visit: Reassess    Nutrition Recommendations:  1. Continue Low-fiber diet. 2.  Start ONS Ensure Chocolate tid. 3.  Will monitor nutritional adequacy, nutrition-related labs, weights, BMs, and clinical progress. Nutrition Assessment: Pt remains nutritionally compromised following surgery for SBO. Diet has advanced to Low-fiber; pt is tolerating well and reports appetite is fine, but hesitant to consume foods r/t fear of excessive/uncomfortable BMs and recurrence of symptoms. Encouraged adequate nutrition and reviewed education on low-fiber diet. Reports son does cooking and left handout at bedside for review. Encouraged supplements to help meet needs; start ONS of Ensure chocolate tid. Malnutrition Assessment:  · Malnutrition Status: Meets the criteria for severe malnutrition  · Context: Acute illness or injury  · Findings of the 6 clinical characteristics of malnutrition (Minimum of 2 out of 6 clinical characteristics is required to make the diagnosis of moderate or severe Protein Calorie Malnutrition based on AND/ASPEN Guidelines):  1. Energy Intake-Less than or equal to 50% of estimated energy requirement, Greater than or equal to 5 days    2. Weight Loss-Unable to assess, unable to assess  3. Fat Loss-Severe subcutaneous fat loss, Triceps  4. Muscle Loss-Severe muscle mass loss, Clavicles (pectoralis and deltoids), Temples (temporalis muscle)  5. Fluid Accumulation-No significant fluid accumulation,    6.   Strength-Not measured    Nutrition Risk Level: High    Nutrient Needs:  · Estimated Daily Total Kcal: 8994-7977 kcal  · Estimated Daily Protein (g): 71-86 g  · Estimated Daily Total Fluid (ml/day): 1 ml/kcal    Nutrition Diagnosis:   · Problem: Severe malnutrition  · Etiology: related to Insufficient energy/nutrient consumption, Alteration in GI function     Signs and symptoms:  as evidenced by Diet history of poor intake, Severe muscle loss, Severe loss of subcutaneous fat    Objective Information:  · Nutrition-Focused Physical Findings: Denies nausea/vomiting. Denies chewing/swallowing difficulty. · Wound Type: Surgical Wound  · Current Nutrition Therapies:  · Oral Diet Orders: Low Fiber   · Oral Diet intake: 51-75%(Liquid diet)  · Oral Nutrition Supplement (ONS) Orders: None  · ONS intake: Unable to assess  · Anthropometric Measures:  · Ht: 6' 1\" (185.4 cm)   · Current Body Wt: 157 lb (71.2 kg)  · Usual Body Wt: 158 lb (71.7 kg)  · % Weight Change:  ,  Pt endorses hx of wt loss. EMR notes wt 11/21/17 of 158#; wt has been stable in this range for a couple years. · Ideal Body Wt: 184 lb (83.5 kg)   · BMI Classification: BMI 18.5 - 24.9 Normal Weight    Nutrition Interventions:   Continue current diet, Start ONS  Continued Inpatient Monitoring, Education Completed(Low-fiber Diet)    Nutrition Evaluation:   · Evaluation: Goals set   · Goals: Pt will tolerate low-fiber diet and consume 50% or greater of meals during admission.       · Monitoring: Nutrition Progression, Meal Intake, Supplement Intake, Diet Tolerance, Weight, Monitor Bowel Function      Electronically signed by Layton Oneill RD Student on 2/11/20 at 12:37 PM    Contact Number: [address] Office [phone]

## 2020-02-12 LAB
ANION GAP SERPL CALCULATED.3IONS-SCNC: 8 MMOL/L (ref 3–16)
BACTERIA: ABNORMAL /HPF
BASOPHILS ABSOLUTE: 0 K/UL (ref 0–0.2)
BASOPHILS RELATIVE PERCENT: 0.4 %
BILIRUBIN URINE: NEGATIVE
BLOOD, URINE: ABNORMAL
BUN BLDV-MCNC: 9 MG/DL (ref 7–20)
CALCIUM SERPL-MCNC: 7.6 MG/DL (ref 8.3–10.6)
CHLORIDE BLD-SCNC: 105 MMOL/L (ref 99–110)
CLARITY: ABNORMAL
CO2: 23 MMOL/L (ref 21–32)
COLOR: YELLOW
CREAT SERPL-MCNC: 0.6 MG/DL (ref 0.8–1.3)
EOSINOPHILS ABSOLUTE: 0.1 K/UL (ref 0–0.6)
EOSINOPHILS RELATIVE PERCENT: 1.2 %
GFR AFRICAN AMERICAN: >60
GFR NON-AFRICAN AMERICAN: >60
GLUCOSE BLD-MCNC: 107 MG/DL (ref 70–99)
GLUCOSE URINE: NEGATIVE MG/DL
HCT VFR BLD CALC: 29 % (ref 40.5–52.5)
HEMOGLOBIN: 9.8 G/DL (ref 13.5–17.5)
KETONES, URINE: 15 MG/DL
LEUKOCYTE ESTERASE, URINE: ABNORMAL
LYMPHOCYTES ABSOLUTE: 1.7 K/UL (ref 1–5.1)
LYMPHOCYTES RELATIVE PERCENT: 14 %
MCH RBC QN AUTO: 32.4 PG (ref 26–34)
MCHC RBC AUTO-ENTMCNC: 33.9 G/DL (ref 31–36)
MCV RBC AUTO: 95.7 FL (ref 80–100)
MICROSCOPIC EXAMINATION: YES
MONOCYTES ABSOLUTE: 1.1 K/UL (ref 0–1.3)
MONOCYTES RELATIVE PERCENT: 8.8 %
NEUTROPHILS ABSOLUTE: 9.2 K/UL (ref 1.7–7.7)
NEUTROPHILS RELATIVE PERCENT: 75.6 %
NITRITE, URINE: NEGATIVE
PDW BLD-RTO: 12.4 % (ref 12.4–15.4)
PH UA: 6 (ref 5–8)
PLATELET # BLD: 363 K/UL (ref 135–450)
PMV BLD AUTO: 7.4 FL (ref 5–10.5)
POTASSIUM REFLEX MAGNESIUM: 4.1 MMOL/L (ref 3.5–5.1)
PROTEIN UA: NEGATIVE MG/DL
RBC # BLD: 3.03 M/UL (ref 4.2–5.9)
RBC UA: >100 /HPF (ref 0–2)
SODIUM BLD-SCNC: 136 MMOL/L (ref 136–145)
SPECIFIC GRAVITY UA: 1.02 (ref 1–1.03)
URINE TYPE: ABNORMAL
UROBILINOGEN, URINE: 1 E.U./DL
WBC # BLD: 12.1 K/UL (ref 4–11)
WBC UA: ABNORMAL /HPF (ref 0–5)

## 2020-02-12 PROCEDURE — 85025 COMPLETE CBC W/AUTO DIFF WBC: CPT

## 2020-02-12 PROCEDURE — 99024 POSTOP FOLLOW-UP VISIT: CPT | Performed by: SURGERY

## 2020-02-12 PROCEDURE — 6370000000 HC RX 637 (ALT 250 FOR IP): Performed by: SURGERY

## 2020-02-12 PROCEDURE — 81001 URINALYSIS AUTO W/SCOPE: CPT

## 2020-02-12 PROCEDURE — 51798 US URINE CAPACITY MEASURE: CPT

## 2020-02-12 PROCEDURE — APPSS45 APP SPLIT SHARED TIME 31-45 MINUTES: Performed by: CLINICAL NURSE SPECIALIST

## 2020-02-12 PROCEDURE — C9113 INJ PANTOPRAZOLE SODIUM, VIA: HCPCS | Performed by: SURGERY

## 2020-02-12 PROCEDURE — 1200000000 HC SEMI PRIVATE

## 2020-02-12 PROCEDURE — 80048 BASIC METABOLIC PNL TOTAL CA: CPT

## 2020-02-12 PROCEDURE — 2580000003 HC RX 258: Performed by: SURGERY

## 2020-02-12 PROCEDURE — 6360000002 HC RX W HCPCS: Performed by: SURGERY

## 2020-02-12 RX ADMIN — PANTOPRAZOLE SODIUM 40 MG: 40 INJECTION, POWDER, FOR SOLUTION INTRAVENOUS at 09:08

## 2020-02-12 RX ADMIN — OXYCODONE 10 MG: 5 TABLET ORAL at 06:00

## 2020-02-12 RX ADMIN — Medication 10 ML: at 09:08

## 2020-02-12 ASSESSMENT — PAIN SCALES - GENERAL: PAINLEVEL_OUTOF10: 7

## 2020-02-12 NOTE — FLOWSHEET NOTE
02/11/20 2030   Assessment   Charting Type Shift assessment   Neurological   Neuro (WDL) WDL   Level of Consciousness 0   Orientation Level Oriented X4   [place] Coma Scale   Eye Opening 4   Best Verbal Response 5   Best Motor Response 6   Sa[place] Coma Scale Score 15   HEENT   HEENT (WDL) X   Right Eye Intact; Impaired vision  (glasses)   Left Eye Intact; Impaired vision  (glasses)   Nose Intact   Teeth Missing teeth   Respiratory   Respiratory (WDL) WDL   Respiratory Pattern Regular   Respiratory Depth Normal   Respiratory Quality/Effort Unlabored   Chest Assessment Chest expansion symmetrical;Trachea midline   L Breath Sounds Clear;Diminished   R Breath Sounds Clear;Diminished   Breath Sounds   Right Upper Lobe Diminished   Right Middle Lobe Diminished   Right Lower Lobe Diminished   Left Upper Lobe Diminished   Left Lower Lobe Diminished   Cardiac   Cardiac (WDL) WDL   Heart Sounds S1, S2   Cardiac Regularity Regular   Cardiac Monitor   Telemetry Monitor On No   Gastrointestinal   Abdominal (WDL) X  (surgical incision)   Passing Flatus Y   Abdomen Inspection Soft;Flat   Tenderness Soft; No guarding;Tenderness   RUQ Bowel Sounds Active   LUQ Bowel Sounds Active   RLQ Bowel Sounds Active   LLQ Bowel Sounds Active   Peripheral Vascular   Peripheral Vascular (WDL) WDL   Edema None   Skin Color/Condition   Skin Color/Condition (WDL) WDL   Skin Integrity   Skin Integrity (WDL) X   Skin Integrity Redness   Location coccyx   Preventative Dressing No   Skin Fold Management No   Multiple Skin Integrity Sites Yes   Dressing Site Sacrum   Date Applied 02/10/20   Assessed this shift?  Yes   Skin Integrity Site 2   Skin Integrity Location 2 Redness   Location 2 bilat elbows   Preventative Dressing Yes   Dressing Site Elbow   Skin Integrity Site 3   Skin Integrity Location 3 Abrasion    Location 3 L elbow   Skin Integrity Site 4   Skin Integrity Location 4 Other (Comment)  (surgical incision )   Location 4 ABD   Musculoskeletal Musculoskeletal (WDL) X   RL Extremity Weakness   LL Extremity Weakness   Genitourinary   Genitourinary (WDL) X  (benito catheter)   Flank Tenderness No   Suprapubic Tenderness No   Dysuria REBA   Anus/Rectum   Anus/Rectum (WDL) WDL   Urethral Catheter Non-latex 16 fr   Placement Date/Time: 02/02/20 1834   Urethral Catheter Timeout: Patient;Procedure;Site/Side;Appropriate Equipment;Sterile technique  Inserted by: cm  Catheter Type: Non-latex  Tube Size (fr): 16 fr  Catheter Balloon Size: 5 mL  Collection Container: S... Catheter Indications Acute urinary retention/obstruction   Site Assessment Pink; No urethral drainage   Urine Color Laurence   Urine Appearance Clear   Incision 02/06/20 Abdomen Mid   Date First Assessed: 02/06/20   Present on Hospital Admission: No  Primary Wound Type: Incision  Location: Abdomen  Wound Location Orientation: Mid   Wound Assessment Clean;Dry; Intact   Shirley-wound Assessment Clean;Dry; Intact   Closure Staples; Open to air   Drainage Amount None   Odor None   Dressing/Treatment Open to air   Psychosocial   Psychosocial (WDL) WDL   Patient Behaviors Calm; Cooperative   Handoff   Communication Given Shift Handoff   Oncoming Nurse/Offgoing Nurse Martha/Mary Ann    Time Handoff Given 2423

## 2020-02-12 NOTE — PROGRESS NOTES
Hospitalist Progress Note      PCP: Nilo Nick    Date of Admission: 2/2/2020    Chief Complaint: Abdominal pain, constipation and vomiting    Hospital Course: H & P  reviewed. Patient admitted with SBO and GEOVANNA. Subjective:      minimal abd pain. Denied any N/V. Pt worked with PT today. Tolerating p.o. Has Stout, moving bowels      Medications:  Reviewed    Infusion Medications    sodium chloride 75 mL/hr at 02/11/20 1625     Scheduled Medications    calcium gluconate IVPB  2 g Intravenous Once    pantoprazole  40 mg Intravenous Daily    enoxaparin  40 mg Subcutaneous Daily    sodium chloride flush  10 mL Intravenous 2 times per day     PRN Meds: oxyCODONE **OR** oxyCODONE, sodium phosphate IVPB **OR** sodium phosphate IVPB, morphine **OR** morphine, potassium chloride **OR** potassium alternative oral replacement **OR** potassium chloride, LORazepam, phenol, ondansetron, sodium chloride flush      Intake/Output Summary (Last 24 hours) at 2/12/2020 1521  Last data filed at 2/12/2020 0535  Gross per 24 hour   Intake 1831 ml   Output 475 ml   Net 1356 ml       Physical Exam Performed:    /65   Pulse 79   Temp 97.8 °F (36.6 °C) (Oral)   Resp 16   Ht 6' 1\" (1.854 m)   Wt 157 lb 3 oz (71.3 kg)   SpO2 97%   BMI 20.74 kg/m²     General appearance: No apparent distress, appears stated age and cooperative. HEENT: Pupils equal, round,  Conjunctivae/corneas clear. Neck: Supple, with full range of motion. No jugular venous distention. Trachea midline. Respiratory:  Normal respiratory effort. Clear to auscultation, bilaterally without Rales/Wheezes/Rhonchi. Cardiovascular: Regular rate and rhythm with normal S1/S2 without murmurs, rubs or gallops. Abdomen: Soft, non tender, non distended, midline incision site with staples in place. Hypoactive BS. Musculoskeletal: No clubbing, cyanosis or edema bilaterally.   Skin: Warm and dry  Neurologic: Alert, speech clear with no overt facial esophagus. Tube should be   advanced and reimaging should be considered to document appropriate   positioning. Left lower lung airspace disease suspicious of pneumonia versus atelectasis. CT ABDOMEN PELVIS WO CONTRAST Additional Contrast? None   Final Result   1. Small-bowel obstruction with a transition point likely in the pelvis. No   evidence of free air to suggest perforation. 2. Distended esophagus which is fluid filled likely related to reflux. 3. Stable position of Benito catheter in the urinary bladder. XR ABDOMEN (KUB) (SINGLE AP VIEW)   Final Result   1. Large amount of formed stool throughout the colon corresponding to given   history of constipation. 2. No bowel obstruction identified. Assessment/Plan:    Active Hospital Problems    Diagnosis    Severe malnutrition ([address] [E43]    Hypertension [I10]    Hyperlipidemia [E78.5]    GEOVANNA (acute kidney injury) ([address] [N17.9]    SBO (small bowel obstruction) ([address] [K56.609]     SBO :  Gen Surg assisting. Pt failed conservative management. S/p exp lap and MARIANN on 2/6. Continue mgt per surgery-   clear liquids, IVF, pain control PRN. Further advancement of diet per surgery      GEOVANNA : likely due to vol depletion, compounded by use of lisinopril, NSAIDs. Held lisinopril, avoid NSAIDs. Improving with IVF. Renal US with no hydronephrosis , left renal cysts. Continue IVF and avoid nephrotoxins. Creatinine normalized. Resolved      Acute urinary retention: likely related to SBO. Improved s/p benito placement. Voiding trial when more ambulatory     Hypernatremia: Likely due to volume depletion, n.p.o. status. Improved with IVF  Mgt per nephro. Na normalized. Hypokalemia: Likely due to GI losses. Mag normal.  Monitoring and replacing as needed      Hyperlipidemia: resume statin when no longer NPO. Hypotension: improved with IVF bolus. BP borderline low but asymptomatic. Continue IVF with boluses PRN.

## 2020-02-12 NOTE — CARE COORDINATION
Chart reviewed day 10. Spoke with surgeons group. Reports patient is not medically ready for d/c. Will need to follow for improved GI function. Plan for admit to [facility].  Lena Wyman RN

## 2020-02-12 NOTE — PLAN OF CARE
Pt a/o, rates pain appropriately using 0-10 pain scale; pt calls out as needed for pain intervention; will continue to monitor and administer intervention as ordered and requested. Ladonna Watkins

## 2020-02-12 NOTE — FLOWSHEET NOTE
02/12/20 1401   Encounter Summary   Services provided to: Patient   Referral/Consult From: Rounding  (long stay)   Support System Family members   Place of [facility]   (2/12 \"Davud\" going to rehab;prayer of hope & healing)   Complexity of Encounter Low   Length of Encounter 15 minutes   Spiritual Assessment Completed Yes   Spiritual/Lutheran   Type Spiritual support   Assessment Approachable;Coping   Intervention Provided reading materials/devotional materials;Nurtured hope   Outcome Engaged in conversation;Expressed gratitude

## 2020-02-12 NOTE — PROGRESS NOTES
passing urine-  No  Drop in urine output-  No  Any other complaints-  No  All other ROS are reviewed and are Negative       Medication:     Current Facility-Administered Medications: 0.9 % sodium chloride infusion, , Intravenous, Continuous  oxyCODONE (ROXICODONE) immediate release tablet 5 mg, 5 mg, Oral, Q4H PRN **OR** oxyCODONE (ROXICODONE) immediate release tablet 10 mg, 10 mg, Oral, Q4H PRN  calcium gluconate 2 g in dextrose 5 % 100 mL IVPB, 2 g, Intravenous, Once  sodium phosphate 11.4 mmol in dextrose 5 % 250 mL IVPB, 0.16 mmol/kg, Intravenous, PRN **OR** sodium phosphate 22.83 mmol in dextrose 5 % 250 mL IVPB, 0.32 mmol/kg, Intravenous, PRN  morphine (PF) injection 2 mg, 2 mg, Intravenous, Q2H PRN **OR** morphine (PF) injection 4 mg, 4 mg, Intravenous, Q2H PRN  potassium chloride (KLOR-CON M) extended release tablet 40 mEq, 40 mEq, Oral, PRN **OR** potassium bicarb-citric acid (EFFER-K) effervescent tablet 40 mEq, 40 mEq, Oral, PRN **OR** potassium chloride 10 mEq/100 mL IVPB (Peripheral Line), 10 mEq, Intravenous, PRN  pantoprazole (PROTONIX) injection 40 mg, 40 mg, Intravenous, Daily  enoxaparin (LOVENOX) injection 40 mg, 40 mg, Subcutaneous, Daily  LORazepam (ATIVAN) injection 0.5 mg, 0.5 mg, Intravenous, Nightly PRN  phenol 1.4 % mouth spray 1 spray, 1 spray, Mouth/Throat, Q2H PRN  ondansetron (ZOFRAN) injection 4 mg, 4 mg, Intravenous, Q6H PRN  sodium chloride flush 0.9 % injection 10 mL, 10 mL, Intravenous, 2 times per day  sodium chloride flush 0.9 % injection 10 mL, 10 mL, Intravenous, PRN       Vitals :     Vitals:    02/12/20 0520   BP:    Pulse:    Resp:    Temp: 97.8 °F (36.6 °C)   SpO2:        I & O :       Intake/Output Summary (Last 24 hours) at 2/12/2020 0854  Last data filed at 2/12/2020 0535  Gross per 24 hour   Intake 1831 ml   Output 475 ml   Net 1356 ml        Physical Examination :     General appearance: Anxious- no, distressed- no, in good spirits- no  Lethargic,   HEENT: Lips-

## 2020-02-13 LAB
ANION GAP SERPL CALCULATED.3IONS-SCNC: 11 MMOL/L (ref 3–16)
ANISOCYTOSIS: ABNORMAL
ATYPICAL LYMPHOCYTE RELATIVE PERCENT: 2 % (ref 0–6)
BANDED NEUTROPHILS RELATIVE PERCENT: 15 % (ref 0–7)
BASOPHILS ABSOLUTE: 0 K/UL (ref 0–0.2)
BASOPHILS RELATIVE PERCENT: 0 %
BUN BLDV-MCNC: 7 MG/DL (ref 7–20)
CALCIUM SERPL-MCNC: 7.5 MG/DL (ref 8.3–10.6)
CHLORIDE BLD-SCNC: 104 MMOL/L (ref 99–110)
CO2: 21 MMOL/L (ref 21–32)
CREAT SERPL-MCNC: <0.5 MG/DL (ref 0.8–1.3)
EOSINOPHILS ABSOLUTE: 0.3 K/UL (ref 0–0.6)
EOSINOPHILS RELATIVE PERCENT: 3 %
GFR AFRICAN AMERICAN: >60
GFR NON-AFRICAN AMERICAN: >60
GLUCOSE BLD-MCNC: 96 MG/DL (ref 70–99)
HCT VFR BLD CALC: 28.5 % (ref 40.5–52.5)
HEMOGLOBIN: 9.8 G/DL (ref 13.5–17.5)
HYPOCHROMIA: ABNORMAL
LYMPHOCYTES ABSOLUTE: 2 K/UL (ref 1–5.1)
LYMPHOCYTES RELATIVE PERCENT: 17 %
MCH RBC QN AUTO: 32.8 PG (ref 26–34)
MCHC RBC AUTO-ENTMCNC: 34.5 G/DL (ref 31–36)
MCV RBC AUTO: 95.2 FL (ref 80–100)
METAMYELOCYTES RELATIVE PERCENT: 1 %
MONOCYTES ABSOLUTE: 0.1 K/UL (ref 0–1.3)
MONOCYTES RELATIVE PERCENT: 1 %
MYELOCYTE PERCENT: 2 %
NEUTROPHILS ABSOLUTE: 8.1 K/UL (ref 1.7–7.7)
NEUTROPHILS RELATIVE PERCENT: 59 %
OVALOCYTES: ABNORMAL
PDW BLD-RTO: 12.4 % (ref 12.4–15.4)
PLATELET # BLD: 399 K/UL (ref 135–450)
PLATELET SLIDE REVIEW: ADEQUATE
PMV BLD AUTO: 7.4 FL (ref 5–10.5)
POTASSIUM REFLEX MAGNESIUM: 3.7 MMOL/L (ref 3.5–5.1)
RBC # BLD: 2.99 M/UL (ref 4.2–5.9)
SLIDE REVIEW: ABNORMAL
SODIUM BLD-SCNC: 136 MMOL/L (ref 136–145)
TOXIC GRANULATION: PRESENT
WBC # BLD: 10.5 K/UL (ref 4–11)

## 2020-02-13 PROCEDURE — 1200000000 HC SEMI PRIVATE

## 2020-02-13 PROCEDURE — 6370000000 HC RX 637 (ALT 250 FOR IP): Performed by: SURGERY

## 2020-02-13 PROCEDURE — 80048 BASIC METABOLIC PNL TOTAL CA: CPT

## 2020-02-13 PROCEDURE — 6360000002 HC RX W HCPCS: Performed by: SURGERY

## 2020-02-13 PROCEDURE — 99024 POSTOP FOLLOW-UP VISIT: CPT | Performed by: SURGERY

## 2020-02-13 PROCEDURE — 36415 COLL VENOUS BLD VENIPUNCTURE: CPT

## 2020-02-13 PROCEDURE — 6370000000 HC RX 637 (ALT 250 FOR IP): Performed by: NURSE PRACTITIONER

## 2020-02-13 PROCEDURE — 85025 COMPLETE CBC W/AUTO DIFF WBC: CPT

## 2020-02-13 PROCEDURE — APPSS45 APP SPLIT SHARED TIME 31-45 MINUTES: Performed by: CLINICAL NURSE SPECIALIST

## 2020-02-13 PROCEDURE — C9113 INJ PANTOPRAZOLE SODIUM, VIA: HCPCS | Performed by: SURGERY

## 2020-02-13 PROCEDURE — 2580000003 HC RX 258: Performed by: SURGERY

## 2020-02-13 PROCEDURE — 2580000003 HC RX 258: Performed by: INTERNAL MEDICINE

## 2020-02-13 RX ORDER — CLONAZEPAM 0.5 MG/1
0.5 TABLET ORAL NIGHTLY PRN
Status: DISCONTINUED | OUTPATIENT
Start: 2020-02-13 | End: 2020-02-14 | Stop reason: HOSPADM

## 2020-02-13 RX ADMIN — CLONAZEPAM 0.5 MG: 0.5 TABLET ORAL at 22:02

## 2020-02-13 RX ADMIN — SODIUM CHLORIDE: 9 INJECTION, SOLUTION INTRAVENOUS at 01:25

## 2020-02-13 RX ADMIN — PANTOPRAZOLE SODIUM 40 MG: 40 INJECTION, POWDER, FOR SOLUTION INTRAVENOUS at 08:20

## 2020-02-13 RX ADMIN — Medication 10 ML: at 08:22

## 2020-02-13 RX ADMIN — OXYCODONE 10 MG: 5 TABLET ORAL at 14:30

## 2020-02-13 RX ADMIN — SODIUM CHLORIDE: 9 INJECTION, SOLUTION INTRAVENOUS at 14:30

## 2020-02-13 ASSESSMENT — PAIN SCALES - GENERAL: PAINLEVEL_OUTOF10: 8

## 2020-02-13 NOTE — PROGRESS NOTES
Assessment completed and documented. VSS. A/ox4, confused at times, for example patient didn't understand why we had to keep his IV in over night and think he is leaving tomorrow morning. Reorients easily. Heavy x1 assist when ambulating. Measuring urine output. Denies pain. Denies further needs at this time. Bed locked and in lowest position. Bedside table and call light within reach. Will continue to monitor.

## 2020-02-13 NOTE — PROGRESS NOTES
Hospitalist Progress Note      PCP: Adarsh Herron    Date of Admission: 2/2/2020    Chief Complaint: Abdominal pain, constipation and vomiting    Hospital Course: H & P  reviewed. Patient admitted with SBO and GEOVANNA. Subjective:      minimal abd pain. Denied any N/V. Pt worked with PT today. Tolerating p.o. Stout was removed, moving bowels and urinating  No fever or confusion      Medications:  Reviewed    Infusion Medications    sodium chloride 75 mL/hr at 02/13/20 0125     Scheduled Medications    calcium gluconate IVPB  2 g Intravenous Once    pantoprazole  40 mg Intravenous Daily    enoxaparin  40 mg Subcutaneous Daily    sodium chloride flush  10 mL Intravenous 2 times per day     PRN Meds: oxyCODONE **OR** oxyCODONE, sodium phosphate IVPB **OR** sodium phosphate IVPB, morphine **OR** morphine, potassium chloride **OR** potassium alternative oral replacement **OR** potassium chloride, LORazepam, phenol, ondansetron, sodium chloride flush      Intake/Output Summary (Last 24 hours) at 2/13/2020 0804  Last data filed at 2/13/2020 0554  Gross per 24 hour   Intake 1110 ml   Output 942 ml   Net 168 ml       Physical Exam Performed:    /67   Pulse 80   Temp 98.1 °F (36.7 °C) (Oral)   Resp 16   Ht 6' 1\" (1.854 m)   Wt 157 lb 3 oz (71.3 kg)   SpO2 95%   BMI 20.74 kg/m²     General appearance: No apparent distress, appears stated age and cooperative. HEENT: Pupils equal, round,  Conjunctivae/corneas clear. Neck: Supple, with full range of motion. No jugular venous distention. Trachea midline. Respiratory:  Normal respiratory effort. Clear to auscultation, bilaterally without Rales/Wheezes/Rhonchi. Cardiovascular: Regular rate and rhythm with normal S1/S2 without murmurs, rubs or gallops. Abdomen: Soft, non tender, non distended, midline incision site with staples in place. Hypoactive BS. Musculoskeletal: No clubbing, cyanosis or edema bilaterally.   Skin: Warm and dry  Neurologic: Alert, speech clear with no overt facial droop. Psychiatric: Alert, not anxious appearing  Capillary Refill: Brisk,< 3 seconds   Peripheral Pulses: +2 palpable, equal bilaterally       Labs:   Recent Labs     02/11/20  0623 02/12/20  0608 02/13/20  0600   WBC 10.4 12.1* 10.5   HGB 9.4* 9.8* 9.8*   HCT 27.7* 29.0* 28.5*    363 399     Recent Labs     02/11/20  0623 02/12/20  0608 02/13/20  0600   * 136 136   K 4.0 4.1 3.7    105 104   CO2 26 23 21   BUN 10 9 7   CREATININE 0.6* 0.6* <0.5*   CALCIUM 7.6* 7.6* 7.5*     No results for input(s): AST, ALT, BILIDIR, BILITOT, ALKPHOS in the last 72 hours. No results for input(s): INR in the last 72 hours. No results for input(s): José Miguel Prayer in the last 72 hours. Urinalysis:      Lab Results   Component Value Date    NITRU Negative 02/12/2020    WBCUA 6-10 02/12/2020    BACTERIA 2+ 02/12/2020    RBCUA >100 02/12/2020    BLOODU MODERATE 02/12/2020    SPECGRAV 1.025 02/12/2020    GLUCOSEU Negative 02/12/2020       Radiology:  FL SMALL BOWEL FOLLOW THROUGH ONLY   Final Result   Addendum 1 of 1   ADDENDUM:   An additional image was obtained at 20.5 hours which continues to    demonstrate   contrast throughout dilated small bowel loops, without contrast seen    within   the colon. Findings are compatible with history of small bowel    obstruction. Final      XR ABDOMEN (2 VIEWS)   Final Result   Nasogastric tube side hole overlies the distal esophagus with tip at the GE   junction. Several cm advancement recommended for optimal positioning. Gas and stool are seen throughout the colon. The previously seen dilated   fluid-filled loops of small bowel on CT are not well visualized on   radiographs. No free air. US RENAL COMPLETE   Final Result   1. No evidence of hydronephrosis.    2. Findings suggestive of presence of left-sided renal cysts, largest of   which measures up to approximately 4 cm in maximum Hypotension: improved with IVF bolus. BP borderline low but asymptomatic. Continue IVF with boluses PRN. Lisinopril on hold. Insomnia: usually on  klonipin nightly at home but unable to take as NPO. Continue dose  IV ativan PRN QHS       Hypokalemia: Replaced    Hypophosphatemia: Replaced    Severe protein calorie malnutrition-dietitian input appreciated    Hypertension-on lisinopril at home-currently on hold secondary to soft blood pressure    Mild leukocytosis-no apparent evidence of infection, UA -obvious evidence of infection, WBC is  currently within normal limit    DVT Prophylaxis: Heparin  Diet: DIET LOW FIBER; Dietary Nutrition Supplements: Standard High Calorie Oral Supplement  Code Status: Full Code    PT/OT Eval Status: Recommendation appreciated  Dispo -defer to primary team  Primary service switched to Gen surg.      Sole Dalton MD

## 2020-02-13 NOTE — PROGRESS NOTES
appetite/oral intake-  No  Confusion  No  Difficulty passing urine-  No  Drop in urine output-  No  Any other complaints-  No  All other ROS are reviewed and are Negative       Medication:     Current Facility-Administered Medications: 0.9 % sodium chloride infusion, , Intravenous, Continuous  oxyCODONE (ROXICODONE) immediate release tablet 5 mg, 5 mg, Oral, Q4H PRN **OR** oxyCODONE (ROXICODONE) immediate release tablet 10 mg, 10 mg, Oral, Q4H PRN  calcium gluconate 2 g in dextrose 5 % 100 mL IVPB, 2 g, Intravenous, Once  sodium phosphate 11.4 mmol in dextrose 5 % 250 mL IVPB, 0.16 mmol/kg, Intravenous, PRN **OR** sodium phosphate 22.83 mmol in dextrose 5 % 250 mL IVPB, 0.32 mmol/kg, Intravenous, PRN  morphine (PF) injection 2 mg, 2 mg, Intravenous, Q2H PRN **OR** morphine (PF) injection 4 mg, 4 mg, Intravenous, Q2H PRN  potassium chloride (KLOR-CON M) extended release tablet 40 mEq, 40 mEq, Oral, PRN **OR** potassium bicarb-citric acid (EFFER-K) effervescent tablet 40 mEq, 40 mEq, Oral, PRN **OR** potassium chloride 10 mEq/100 mL IVPB (Peripheral Line), 10 mEq, Intravenous, PRN  pantoprazole (PROTONIX) injection 40 mg, 40 mg, Intravenous, Daily  enoxaparin (LOVENOX) injection 40 mg, 40 mg, Subcutaneous, Daily  LORazepam (ATIVAN) injection 0.5 mg, 0.5 mg, Intravenous, Nightly PRN  phenol 1.4 % mouth spray 1 spray, 1 spray, Mouth/Throat, Q2H PRN  ondansetron (ZOFRAN) injection 4 mg, 4 mg, Intravenous, Q6H PRN  sodium chloride flush 0.9 % injection 10 mL, 10 mL, Intravenous, 2 times per day  sodium chloride flush 0.9 % injection 10 mL, 10 mL, Intravenous, PRN       Vitals :     Vitals:    02/13/20 0820   BP: 130/70   Pulse: 84   Resp: 16   Temp: 97.8 °F (36.6 °C)   SpO2: 97%       I & O :       Intake/Output Summary (Last 24 hours) at 2/13/2020 0928  Last data filed at 2/13/2020 0820  Gross per 24 hour   Intake 1110 ml   Output 1042 ml   Net 68 ml        Physical Examination :     General appearance: Anxious- no,

## 2020-02-13 NOTE — PROGRESS NOTES
[facility] GENERAL SURGERY    Surgery Progress Note           POD # 8    PATIENT NAME: Deep Nowak     TODAY'S DATE: 2/13/2020    INTERVAL HISTORY:    Doing okay, only wants to drink ensure and have chicken noodle soup. Reluctant to eat solid food despite encouragement. Voiding with benito out. OBJECTIVE:   VITALS:  /70   Pulse 84   Temp 97.8 °F (36.6 °C) (Oral)   Resp 16   Ht 6' 1\" (1.854 m)   Wt 157 lb 3 oz (71.3 kg)   SpO2 97%   BMI 20.74 kg/m²     INTAKE/OUTPUT:    I/O last 3 completed shifts: In: 1110 [P.O.:1110]  Out: 942 [Urine:942]  I/O this shift:  In: -   Out: 175 [Urine:175]              CONSTITUTIONAL:  awake and alert  LUNGS:  no crackles or wheezing  ABDOMEN:   normal bowel sounds, soft, non-distended, minimal tender   INCISION: dry    Data:  CBC:   Recent Labs     02/11/20  0623 02/12/20  0608 02/13/20  0600   WBC 10.4 12.1* 10.5   HGB 9.4* 9.8* 9.8*   HCT 27.7* 29.0* 28.5*    363 399     15% bandemia    BMP:    Recent Labs     02/11/20  0623 02/12/20  0608 02/13/20  0600   * 136 136   K 4.0 4.1 3.7    105 104   CO2 26 23 21   BUN 10 9 7   CREATININE 0.6* 0.6* <0.5*   GLUCOSE 98 107* 96     Urinalysis with Microscopic [[id number]] (Abnormal)    Collected: 02/12/20 1600    Updated: 02/12/20 1818    Specimen Source: Urine, clean catch     Color, UA Yellow    Clarity, UA CLOUDYAbnormal     Glucose, Ur Negative mg/dL    Bilirubin Urine Negative    Ketones, Urine 15Abnormal  mg/dL    Specific Gravity, UA 1.025    Blood, Urine MODERATEAbnormal     pH, UA 6.0    Protein, UA Negative mg/dL    Urobilinogen, Urine 1.0 E.U./dL    Nitrite, Urine Negative    Leukocyte Esterase, Urine TRACEAbnormal     Microscopic Examination YES    Urine Type NotGiven    Comment: Urine received in a container without preservatives. WBC, UA 6-10Abnormal  /HPF    RBC, UA >100Abnormal  /HPF    Bacteria, UA 2+Abnormal  /HPF   Narrative:       Culture pending    ASSESSMENT AND PLAN:  79 y.o. male with SBO status post lysis of adhesions  GI: continue low fiber diet, encourage reg food  : voiding with benito out, appreciate nephrology input - ok from their standpoint to d/c  ID: leukocytosis trending down today, but with bandemia. Severe protein and calorie malnutrition in setting of acute illness: encourage increased PO intake  Activity: continue with PT/OT  Dispo: [facility] can accept the pt at d/c - perhaps 1-2 days. Electronically signed by JAMEY Lassiter CNP     Patient seen and agree with above. Afebrile and WBC improved but bandemia seen. Await urine culture. Encourage po intake.   Discharge possible tomorrow    Stephan Hitchcock MD

## 2020-02-13 NOTE — PLAN OF CARE
Pt remained free of falls. Call light within reach. Bed alarm on. Non skid footwear in place. Bed locked and in lowest position. Will continue to monitor.

## 2020-02-14 VITALS
BODY MASS INDEX: 20.83 KG/M2 | SYSTOLIC BLOOD PRESSURE: 115 MMHG | TEMPERATURE: 97.9 F | HEIGHT: 73 IN | OXYGEN SATURATION: 98 % | RESPIRATION RATE: 16 BRPM | HEART RATE: 76 BPM | DIASTOLIC BLOOD PRESSURE: 63 MMHG | WEIGHT: 157.19 LBS

## 2020-02-14 LAB
ANION GAP SERPL CALCULATED.3IONS-SCNC: 8 MMOL/L (ref 3–16)
BASOPHILS ABSOLUTE: 0 K/UL (ref 0–0.2)
BASOPHILS RELATIVE PERCENT: 0.2 %
BUN BLDV-MCNC: 5 MG/DL (ref 7–20)
CALCIUM SERPL-MCNC: 7.7 MG/DL (ref 8.3–10.6)
CHLORIDE BLD-SCNC: 103 MMOL/L (ref 99–110)
CO2: 22 MMOL/L (ref 21–32)
CREAT SERPL-MCNC: <0.5 MG/DL (ref 0.8–1.3)
EOSINOPHILS ABSOLUTE: 0.2 K/UL (ref 0–0.6)
EOSINOPHILS RELATIVE PERCENT: 1.8 %
GFR AFRICAN AMERICAN: >60
GFR NON-AFRICAN AMERICAN: >60
GLUCOSE BLD-MCNC: 98 MG/DL (ref 70–99)
HCT VFR BLD CALC: 29.1 % (ref 40.5–52.5)
HEMOGLOBIN: 10 G/DL (ref 13.5–17.5)
LYMPHOCYTES ABSOLUTE: 1.7 K/UL (ref 1–5.1)
LYMPHOCYTES RELATIVE PERCENT: 14.8 %
MCH RBC QN AUTO: 32.7 PG (ref 26–34)
MCHC RBC AUTO-ENTMCNC: 34.3 G/DL (ref 31–36)
MCV RBC AUTO: 95.4 FL (ref 80–100)
MONOCYTES ABSOLUTE: 1.1 K/UL (ref 0–1.3)
MONOCYTES RELATIVE PERCENT: 9.8 %
NEUTROPHILS ABSOLUTE: 8.5 K/UL (ref 1.7–7.7)
NEUTROPHILS RELATIVE PERCENT: 73.4 %
PDW BLD-RTO: 12.7 % (ref 12.4–15.4)
PLATELET # BLD: 445 K/UL (ref 135–450)
PMV BLD AUTO: 7 FL (ref 5–10.5)
POTASSIUM REFLEX MAGNESIUM: 3.6 MMOL/L (ref 3.5–5.1)
RBC # BLD: 3.06 M/UL (ref 4.2–5.9)
SODIUM BLD-SCNC: 133 MMOL/L (ref 136–145)
WBC # BLD: 11.5 K/UL (ref 4–11)

## 2020-02-14 PROCEDURE — 6360000002 HC RX W HCPCS: Performed by: SURGERY

## 2020-02-14 PROCEDURE — 85025 COMPLETE CBC W/AUTO DIFF WBC: CPT

## 2020-02-14 PROCEDURE — 97110 THERAPEUTIC EXERCISES: CPT

## 2020-02-14 PROCEDURE — 97535 SELF CARE MNGMENT TRAINING: CPT

## 2020-02-14 PROCEDURE — 2580000003 HC RX 258: Performed by: SURGERY

## 2020-02-14 PROCEDURE — 6370000000 HC RX 637 (ALT 250 FOR IP): Performed by: SURGERY

## 2020-02-14 PROCEDURE — APPSS45 APP SPLIT SHARED TIME 31-45 MINUTES: Performed by: CLINICAL NURSE SPECIALIST

## 2020-02-14 PROCEDURE — C9113 INJ PANTOPRAZOLE SODIUM, VIA: HCPCS | Performed by: SURGERY

## 2020-02-14 PROCEDURE — 97116 GAIT TRAINING THERAPY: CPT

## 2020-02-14 PROCEDURE — 80048 BASIC METABOLIC PNL TOTAL CA: CPT

## 2020-02-14 RX ADMIN — OXYCODONE 10 MG: 5 TABLET ORAL at 12:19

## 2020-02-14 RX ADMIN — PANTOPRAZOLE SODIUM 40 MG: 40 INJECTION, POWDER, FOR SOLUTION INTRAVENOUS at 08:56

## 2020-02-14 RX ADMIN — ENOXAPARIN SODIUM 40 MG: 40 INJECTION SUBCUTANEOUS at 08:55

## 2020-02-14 RX ADMIN — MORPHINE SULFATE 4 MG: 4 INJECTION, SOLUTION INTRAMUSCULAR; INTRAVENOUS at 08:55

## 2020-02-14 RX ADMIN — Medication 10 ML: at 08:56

## 2020-02-14 ASSESSMENT — PAIN SCALES - GENERAL
PAINLEVEL_OUTOF10: 10
PAINLEVEL_OUTOF10: 5
PAINLEVEL_OUTOF10: 2
PAINLEVEL_OUTOF10: 2

## 2020-02-14 ASSESSMENT — PAIN DESCRIPTION - PAIN TYPE: TYPE: SURGICAL PAIN

## 2020-02-14 ASSESSMENT — PAIN DESCRIPTION - LOCATION
LOCATION: ABDOMEN
LOCATION: ABDOMEN

## 2020-02-14 NOTE — PROGRESS NOTES
preservatives. WBC, UA 6-10Abnormal  /HPF    RBC, UA >100Abnormal  /HPF    Bacteria, UA 2+Abnormal  /HPF   Narrative:         ASSESSMENT AND PLAN:  79 y.o. male with SBO status post lysis of adhesions  GI: continue low fiber diet, encourage reg food - continue ensure. : voiding with benito out, however does endorse pain prior to urination   ID: leukocytosis slightly elevated - no urine cultures apparently were instituted after UA despite the order and a microscopic being completed which should reflux to culture - per lab, apparently there are 2 different orders for UA/microscopic with reflux to culture so a culture was never done. Pt without fevers, very mild leukocytosis, voiding without catheter and UO improved. Severe protein and calorie malnutrition in setting of acute illness: encourage increased PO intake  Activity: continue with PT/OT  Dispo: plan for discharge to [facility] today    Electronically signed by JAMEY Orozco CNP     0819 addendum:     Son at bedside. Pt resting comfortably, no pain currently. Son to transport to [facility] - discussed f/u in office with son.      Electronically signed by JAMEY Orozco CNP on 2/14/2020 at 1:32 PM

## 2020-02-14 NOTE — PLAN OF CARE
Problem: Pain:  Goal: Pain level will decrease  Description  Pain level will decrease  Outcome: Ongoing  Note:   Pt A/O. Pt calls out as needed for pain intervention. Rates pain appropriately on 1-10 pain scales. Will continue to monitor and administer interventions as ordered and requested.

## 2020-02-14 NOTE — PLAN OF CARE
Problem: Pain:  Description  Pain management should include both nonpharmacologic and pharmacologic interventions. Goal: Pain level will decrease  Description  Pain level will decrease  Outcome: Ongoing  Note:   Pt verbalizes how to use 1-10 pain scale. PRN coreen available. Reassessment of pain after administration of pain medication. Use of repositioning for comfort. Will continue to monitor.

## 2020-02-14 NOTE — PROGRESS NOTES
Physical Therapy  Facility/Department: [facility] C3 TELE/MED SURG/ONC  Daily Treatment Note  NAME: Davey Stinson  : 1940  MRN: [id number]    Date of Service: 2020    Discharge Recommendations:  Subacute/Skilled Nursing Facility   PT Equipment Recommendations  Equipment Needed: No  Other: defer to patient's facility. Barriers to home discharge:   [x] Steps to access home entry or bed/bath:   [] No rail with steps to access home entry or bed/bath   [] Reported available assist at home upon discharge limited   [x] Patient or family requests DC to other than home    [] Patient reports expectation of post acute Discharge disposition to other than home   [] Other:    Assessment   Body structures, Functions, Activity limitations: Decreased functional mobility ; Decreased posture;Decreased endurance;Decreased cognition;Decreased strength;Decreased balance; Increased pain;Decreased safe awareness;Decreased ADL status; Decreased high-level IADLs  Assessment: Patient is a 79 y.o. male admitted to [facility] secondary to SBO status post ex lap and extensive lysis of adhesions 20. Patient is progressing well with his physical therapy goals. Patient continues to be functioning well below his baseline requiring grossly min assist for transfers and moderate assistance to ambulate with a rolling walker. Patient continues to present with deficits listed above. PT continues to recommend that this patient receive skilled PT in the SNF setting, when medically stable, in order to address these deficits and to help him maximize his safety and independence with all functional mobility. PT to continue to follow. Treatment Diagnosis: Impaired functional mobility and gait  Specific instructions for Next Treatment: Progress mobility as tolerated, monitor BP  Prognosis: Good  Decision Making: Medium Complexity  PT Education: Goals; Energy Conservation;PT Role;General Safety;Plan of Care; Injury Prevention;Gait with increased time; Follows one step commands with repetition  Attention Span: Attends with cues to redirect  Problem Solving: Assistance required to generate solutions;Assistance required to identify errors made;Assistance required to implement solutions;Assistance required to correct errors made  Insights: Decreased awareness of deficits  Initiation: Requires cues for some  Sequencing: Requires cues for some  Objective   Bed mobility  Rolling to Left: Stand by assistance  Supine to Sit: Contact guard assistance  Sit to Supine: Contact guard assistance  Scooting: Stand by assistance  Comment: log roll  Transfers  Sit to Stand: Minimal Assistance  Stand to sit: Minimal Assistance  Bed to Chair: Minimal assistance  Comment: with rolling walker. cueing for hand placement. Ambulation  Ambulation?: Yes  More Ambulation?: No  Ambulation 1  Surface: level tile  Device: Rolling Walker  Assistance: Moderate assistance  Quality of Gait: unsteady gait pattern, 1 loss of balance moderate assistance to correct. very wide base of support. cueing to narrow base of support. RW out too far from patient. cueing to maintain base of support within rolling walker. mod assist at times to help patient turn his rolling walker. Gait Deviations: Slow Nelli; Increased AMANDA; Decreased step length;Decreased step height;Shuffles  Distance: 15 feet x 2. Comments: No complaints of shortness of breath, chest pain. He did complain of mild lightheadedness that improved after returning to bed. Stairs/Curb  Stairs?: No     Balance  Posture: Fair  Sitting - Static: Fair  Sitting - Dynamic: Fair  Standing - Static: Poor  Standing - Dynamic: Poor(with RW)  Exercises  Quad Sets: 1 x 5  Heelslides: 1 x 5  Gluteal Sets: 1 x 5  Hip Flexion: 1 x 10  Hip Abduction: 1 x 5 (supine)  Ankle Pumps: 1 x 10  Comments: cueing for sequencing and technique.    Other exercises  Other exercises?: No                        AM-PAC Score     AM-PAC Inpatient Mobility

## 2020-02-14 NOTE — PROGRESS NOTES
Pt assessment completed and charted. Pt VSS. Pt is a/o. Pt has c/o increased abd pain this morning - medicated per physician order (See MAR). Physician made aware of increase in pain level. Pt denies n/v. Pt states no bowel movement overnight or today. Call light within reach. Bed alarm is on.

## 2020-02-14 NOTE — PROGRESS NOTES
Pt alert and oriented, some confusion/forgetfullness. VSS. Shift assessment completed and documented. Pt ate breakfast, seems to be tolerating, but is really only sticking with ensures. Denies any needs at this time. Bed locked and in lowest position, call light within reach. Will continue to monitor.

## 2020-02-14 NOTE — PROGRESS NOTES
Hospitalist Progress Note      PCP: Faustino Mcnulty    Date of Admission: 2/2/2020    Chief Complaint: Abdominal pain, constipation and vomiting    Hospital Course: H & P  reviewed. Patient admitted with SBO and GEOVANNA. Subjective:       C/o  abd pain. Diffuse, 7 in intensity , more with breakfast currently he does not have any pain  Stout was removed, moving bowels and urinating  No fever or confusion      Medications:  Reviewed    Infusion Medications     Scheduled Medications    calcium gluconate IVPB  2 g Intravenous Once    pantoprazole  40 mg Intravenous Daily    enoxaparin  40 mg Subcutaneous Daily    sodium chloride flush  10 mL Intravenous 2 times per day     PRN Meds: clonazePAM, oxyCODONE **OR** oxyCODONE, sodium phosphate IVPB **OR** sodium phosphate IVPB, morphine **OR** morphine, potassium chloride **OR** potassium alternative oral replacement **OR** potassium chloride, phenol, ondansetron, sodium chloride flush      Intake/Output Summary (Last 24 hours) at 2/14/2020 0815  Last data filed at 2/14/2020 0505  Gross per 24 hour   Intake 4270.93 ml   Output 1570 ml   Net 2700.93 ml       Physical Exam Performed:    /66   Pulse 78   Temp 97.9 °F (36.6 °C) (Oral)   Resp 16   Ht 6' 1\" (1.854 m)   Wt 157 lb 3 oz (71.3 kg)   SpO2 96%   BMI 20.74 kg/m²     General appearance: No apparent distress, appears stated age and cooperative. HEENT: Pupils equal, round,  Conjunctivae/corneas clear. Neck: Supple, with full range of motion. No jugular venous distention. Trachea midline. Respiratory:  Normal respiratory effort. Clear to auscultation, bilaterally without Rales/Wheezes/Rhonchi. Cardiovascular: Regular rate and rhythm with normal S1/S2 without murmurs, rubs or gallops. Abdomen: Soft, mildly tender, non distended, midline incision site with staples in place. Has BS. Musculoskeletal: No clubbing, cyanosis or edema bilaterally.   Skin: Warm and dry  Neurologic: Alert, speech clear

## 2020-02-14 NOTE — PROGRESS NOTES
Occupational Therapy  Facility/Department: [facility] C3 TELE/MED SURG/ONC  Daily Treatment Note  NAME: Madison Camarena  : 1940  MRN: [id number]    Date of Service: 2020    Discharge Recommendations:  Subacute/Skilled Nursing Facility     Assessment   Performance deficits / Impairments: Decreased functional mobility ; Decreased balance;Decreased ADL status; Decreased endurance;Decreased high-level IADLs;Decreased cognition  Assessment: Pt agreeable to therapy this date. Pt continues to require Min x2-Mod x1 for functional mobility and transfers using RW. Pt also requiring cues for safe use of RW. Pt required Max A for LB dressing, and Mod A for toilet transfers and toileting. Pt continues to be orthostatic with position changes, and symptomatic with c/o dizziness. Cont OT POC. Prognosis: Good  Decision Making: Medium Complexity  OT Education: Plan of Care;ADL Adaptive Strategies;OT Role;Transfer Training;Energy Conservation;Equipment; Family Education;Precautions  REQUIRES OT FOLLOW UP: Yes  Activity Tolerance  Activity Tolerance: Patient Tolerated treatment well;Treatment limited secondary to medical complications (free text)  Activity Tolerance: Orthostatic throughout session; BP izesvd=941/67, sitting EOB= 90/57, supine after activity (94/53)  Safety Devices  Safety Devices in place: Yes  Type of devices: Call light within reach;Nurse notified;Gait belt;Left in bed;Bed alarm in place       Patient Diagnosis(es): The primary encounter diagnosis was GEOVANNA (acute kidney injury) ([facility] [address] Diagnoses of SBO (small bowel obstruction) ([address] Urinary retention, Constipation, unspecified constipation type, History of compression fracture of spine, and Ketonuria were also pertinent to this visit. has a past medical history of Hyperlipidemia and Hypertension. has a past surgical history that includes Small intestine surgery (N/A, 2020).     Restrictions  Restrictions/Precautions  Restrictions/Precautions: General stimuli  Following Commands: Follows one step commands with increased time; Follows one step commands with repetition  Attention Span: Attends with cues to redirect  Problem Solving: Assistance required to generate solutions;Assistance required to identify errors made;Assistance required to implement solutions;Assistance required to correct errors made  Insights: Decreased awareness of deficits  Initiation: Requires cues for some  Sequencing: Requires cues for some      Type of ROM/Therapeutic Exercise  Type of ROM/Therapeutic Exercise: AROM  Comment: B UE supine in bed  Exercises  Elbow Flexion: x10  Elbow Extension: x10  Supination: x10  Pronation: x10  Wrist Flexion: x10  Wrist Extension: x10  Grasp/Release: x10  LUE AROM (degrees)  LUE AROM : WFL  Left Hand AROM (degrees)  Left Hand AROM: WFL  RUE AROM (degrees)  RUE AROM : WFL  Right Hand AROM (degrees)  Right Hand AROM: WFL      Plan   Plan  Times per week: 3-5x/week   Current Treatment Recommendations: Strengthening, Balance Training, Functional Mobility Training, Endurance Training, Equipment Evaluation, Education, & procurement, Patient/Caregiver Education & Training, Self-Care / ADL, Gait Training, Safety Education & Training, Positioning, Pain Management, Home Management Training    AM-PAC Score     AM-[facility] Inpatient Daily Activity Raw Score: 14 (02/14/20 1301)  AM-PAC Inpatient ADL T-Scale Score : 33.39 (02/14/20 1301)  ADL Inpatient CMS 0-100% Score: 59.67 (02/14/20 1301)  ADL Inpatient CMS G-Code Modifier : CK (02/14/20 1301)    Goals  Short term goals  Time Frame for Short term goals: 1 week (by 2/16/20)  Short term goal 1: Pt will complete functional transfers with SBA  Short term goal 2: Pt will complete LE dressing with Min A  Short term goal 3: Pt will perform 3-5 minutes dynamic standing activity with CGA by 2/13  Patient Goals   Patient goals : \"to go home\"     Therapy Time   Individual Concurrent Group Co-treatment   Time In 1050         Time Out 250 Bk Snyder         Timed Code Treatment Minutes: 073 [facility], OTR/L

## 2020-02-27 ENCOUNTER — TELEPHONE (OUTPATIENT)
Dept: SURGERY | Age: 80
End: 2020-02-27

## 2020-02-27 NOTE — TELEPHONE ENCOUNTER
Kris Rabago from [facility] called - stated pt will be getting in-home therapy 2x p/wk for 1st week, then 1x p/wk thereafter

## 2020-03-03 ENCOUNTER — OFFICE VISIT (OUTPATIENT)
Dept: SURGERY | Age: 80
End: 2020-03-03

## 2020-03-03 VITALS
HEART RATE: 78 BPM | SYSTOLIC BLOOD PRESSURE: 92 MMHG | HEIGHT: 73 IN | DIASTOLIC BLOOD PRESSURE: 70 MMHG | BODY MASS INDEX: 20.74 KG/M2

## 2020-03-03 PROCEDURE — 99024 POSTOP FOLLOW-UP VISIT: CPT | Performed by: SURGERY

## 2020-03-03 NOTE — PROGRESS NOTES
Surgery Post-op Progress Note    HPI:  Notes reviewed, and agree with documentation in pt's chart. Postoperative Follow-up: Patient presents for 4 week follow-up status post ex lap, MARIANN for internal hernia/SBO . Eating a regular diet without difficulty. Bowel movements are Normal.  The patient is not having any pain. .     ROS:    · 10 point review of systems performed; please refer to HPI with pertinent positives, all other ROS are negative    PE:   CONSTITUTIONAL:  awake and alert    ABDOMEN: soft, non-distended, non-tender     INCISION: clean, dry, no drainage, healed, staples still in place      ASSESSMENT:   Diagnosis Orders   1.  Postop check     ·       PLAN:    · Staples removed without incident  Continue with routine wound care as discussed  Gradually increase activities as tolerated  Follow-up as needed; please call with questions or concerns  ·

## (undated) DEVICE — DRESSING FOAM W4XL10IN AG SIL ADH ANTIMIC POSTOP OPTIFOAM

## (undated) DEVICE — Device

## (undated) DEVICE — SUTURE PERMAHAND SZ 3-0 L18IN NONABSORBABLE BLK L26MM SH C013D

## (undated) DEVICE — SUTURE MCRYL + SZ 4-0 L18IN ABSRB UD L19MM PS-2 3/8 CIR MCP496G

## (undated) DEVICE — BLADE ES L6IN ELASTOMERIC COAT EXT DURABLE BEND UPTO 90DEG

## (undated) DEVICE — GLOVE,SURG,SENSICARE SLT,LF,PF,7.5: Brand: MEDLINE

## (undated) DEVICE — BARRIER, ABSORBABLE, ADHESION: Brand: SEPRAFILM®

## (undated) DEVICE — SUTURE VCRL + SZ 3-0 L18IN ABSRB UD SH 1/2 CIR TAPERCUT NDL VCP864D

## (undated) DEVICE — GOWN,REINF,POLY,AURORA,XLNG/XXL,STRL: Brand: MEDLINE

## (undated) DEVICE — SUTURE PDS II SZ 0 L60IN ABSRB VLT L48MM CTX 1/2 CIR Z990G

## (undated) DEVICE — SUTURE PERMAHAND SZ 2-0 L18IN NONABSORBABLE BLK L26MM SH C012D

## (undated) DEVICE — STAPLER SKIN H3.9MM WIRE DIA0.58MM CRWN 6.9MM 35 STPL FIX

## (undated) DEVICE — SOLUTION IV IRRIG POUR BRL 0.9% SODIUM CHL 2F7124